# Patient Record
Sex: MALE | Race: WHITE | NOT HISPANIC OR LATINO | Employment: FULL TIME | URBAN - METROPOLITAN AREA
[De-identification: names, ages, dates, MRNs, and addresses within clinical notes are randomized per-mention and may not be internally consistent; named-entity substitution may affect disease eponyms.]

---

## 2017-01-04 ENCOUNTER — APPOINTMENT (OUTPATIENT)
Dept: PHYSICAL THERAPY | Facility: CLINIC | Age: 52
End: 2017-01-04
Payer: COMMERCIAL

## 2017-01-04 PROCEDURE — 97140 MANUAL THERAPY 1/> REGIONS: CPT

## 2017-01-04 PROCEDURE — 97110 THERAPEUTIC EXERCISES: CPT

## 2017-01-06 ENCOUNTER — APPOINTMENT (OUTPATIENT)
Dept: PHYSICAL THERAPY | Facility: CLINIC | Age: 52
End: 2017-01-06
Payer: COMMERCIAL

## 2017-01-06 PROCEDURE — 97140 MANUAL THERAPY 1/> REGIONS: CPT

## 2017-01-06 PROCEDURE — 97110 THERAPEUTIC EXERCISES: CPT

## 2017-01-09 ENCOUNTER — APPOINTMENT (OUTPATIENT)
Dept: PHYSICAL THERAPY | Facility: CLINIC | Age: 52
End: 2017-01-09
Payer: COMMERCIAL

## 2017-01-09 PROCEDURE — 97140 MANUAL THERAPY 1/> REGIONS: CPT

## 2017-01-09 PROCEDURE — 97110 THERAPEUTIC EXERCISES: CPT

## 2017-01-11 ENCOUNTER — APPOINTMENT (OUTPATIENT)
Dept: PHYSICAL THERAPY | Facility: CLINIC | Age: 52
End: 2017-01-11
Payer: COMMERCIAL

## 2017-01-11 PROCEDURE — 97110 THERAPEUTIC EXERCISES: CPT

## 2017-01-11 PROCEDURE — 97140 MANUAL THERAPY 1/> REGIONS: CPT

## 2017-01-16 ENCOUNTER — APPOINTMENT (OUTPATIENT)
Dept: PHYSICAL THERAPY | Facility: CLINIC | Age: 52
End: 2017-01-16
Payer: COMMERCIAL

## 2017-01-16 PROCEDURE — 97140 MANUAL THERAPY 1/> REGIONS: CPT

## 2017-01-16 PROCEDURE — 97110 THERAPEUTIC EXERCISES: CPT

## 2017-01-18 ENCOUNTER — APPOINTMENT (OUTPATIENT)
Dept: PHYSICAL THERAPY | Facility: CLINIC | Age: 52
End: 2017-01-18
Payer: COMMERCIAL

## 2017-01-18 PROCEDURE — 97110 THERAPEUTIC EXERCISES: CPT

## 2017-01-18 PROCEDURE — 97140 MANUAL THERAPY 1/> REGIONS: CPT

## 2017-01-23 ENCOUNTER — APPOINTMENT (OUTPATIENT)
Dept: PHYSICAL THERAPY | Facility: CLINIC | Age: 52
End: 2017-01-23
Payer: COMMERCIAL

## 2017-01-23 PROCEDURE — 97140 MANUAL THERAPY 1/> REGIONS: CPT

## 2017-01-23 PROCEDURE — 97110 THERAPEUTIC EXERCISES: CPT

## 2017-01-25 ENCOUNTER — APPOINTMENT (OUTPATIENT)
Dept: PHYSICAL THERAPY | Facility: CLINIC | Age: 52
End: 2017-01-25
Payer: COMMERCIAL

## 2017-01-25 PROCEDURE — 97140 MANUAL THERAPY 1/> REGIONS: CPT

## 2017-01-25 PROCEDURE — 97110 THERAPEUTIC EXERCISES: CPT

## 2017-02-01 ENCOUNTER — APPOINTMENT (OUTPATIENT)
Dept: PHYSICAL THERAPY | Facility: CLINIC | Age: 52
End: 2017-02-01
Payer: COMMERCIAL

## 2017-02-01 PROCEDURE — 97140 MANUAL THERAPY 1/> REGIONS: CPT

## 2017-02-01 PROCEDURE — 97110 THERAPEUTIC EXERCISES: CPT

## 2017-02-02 ENCOUNTER — APPOINTMENT (OUTPATIENT)
Dept: PHYSICAL THERAPY | Facility: CLINIC | Age: 52
End: 2017-02-02
Payer: COMMERCIAL

## 2017-02-02 PROCEDURE — 97140 MANUAL THERAPY 1/> REGIONS: CPT

## 2017-02-02 PROCEDURE — 97110 THERAPEUTIC EXERCISES: CPT

## 2017-02-06 ENCOUNTER — APPOINTMENT (OUTPATIENT)
Dept: PHYSICAL THERAPY | Facility: CLINIC | Age: 52
End: 2017-02-06
Payer: COMMERCIAL

## 2017-02-06 PROCEDURE — 97140 MANUAL THERAPY 1/> REGIONS: CPT

## 2017-02-06 PROCEDURE — 97110 THERAPEUTIC EXERCISES: CPT

## 2017-02-08 ENCOUNTER — APPOINTMENT (OUTPATIENT)
Dept: PHYSICAL THERAPY | Facility: CLINIC | Age: 52
End: 2017-02-08
Payer: COMMERCIAL

## 2017-02-08 PROCEDURE — 97140 MANUAL THERAPY 1/> REGIONS: CPT

## 2017-02-08 PROCEDURE — 97110 THERAPEUTIC EXERCISES: CPT

## 2017-04-19 ENCOUNTER — ALLSCRIPTS OFFICE VISIT (OUTPATIENT)
Dept: OTHER | Facility: OTHER | Age: 52
End: 2017-04-19

## 2018-01-10 NOTE — MISCELLANEOUS
Message  S/w pt on telephone regarding surgery scheduled 11/7/16  Verified allergies with patient and went over pre-op instructions, including hibiclens bathing and NPO status  Pt currently denies taking any blood thinning medications and states that he has been holding his celebrex for about a week now  Answered any questions he may have had at this time        Signatures   Electronically signed by : Nani Griffin RN; Nov 4 2016 10:51AM EST                       (Author)

## 2018-01-11 NOTE — PROGRESS NOTES
Preliminary Nursing Report                Patient Information    Initial Encounter Entry Date:   10/24/2016 1:16 PM EST (Automated Transmission Automated Transmission)       Last Modified:   {Gregoria Yap}              Legal Name: [address] [place] [address] Road Number:        YOB: 1965        Age (years): 54        Gender: M        Body Mass Index (BMI): 1 kg/m2        Height: 73 in  Weight: 11 12 lbs (5 kgs)           Address:   [address] [place] [address] [place]              Phone: [phone]   (consent to leave messages)        Email:        Ethnicity: Decline to State        Holiness:        Marital Status:        Preferred Language: English        Race: Other Race                    Patient Insurance Information        Primary Insurance Information Carrier Name: {Primary  CarrierName}           Carrier Address:   {Primary  CarrierAddress}              Carrier Phone: {Primary  CarrierPhone}          Group Number: {Primary  GroupNumber}          Policy Number: {Primary  PolicyNumber}          Insured Name: {Primary  InsuredName}          Insured : {Primary  InsuredDOB}          Relationship to Insured: {Primary  RelationshiptoInsured}           Secondary Insurance Information Carrier Name: {Secondary  CarrierName}           Carrier Address:   {Secondary  CarrierAddress}              Carrier Phone: {Secondary  CarrierPhone}          Group Number: {Secondary  GroupNumber}          Policy Number: {Secondary  PolicyNumber}          Insured Name: {Secondary  InsuredName}          Insured : {Secondary  InsuredDOB}          Relationship to Insured: {Secondary  RelationshiptoInsured}                       Health Profile   Booking #:   [employer] #: [id number]               DOS: 2016    Surgery : LOW BACK DISK SURGERY    Add'l Procedures/Notes:     Surgery Risk: Intermediate          Precautions     BMI                   Allergies    No Known Drug Allergies Medications    None               Conditions    Benign essential hypertension       Chronic back pain       Lumbar disc herniation       Lumbar radiculitis       Sciatica of left side               Family History    None             Surgical History    None             Social History    Functioning activity level       Clinical Comments: participates in moderate activities both inside and outside of the home;        High school or GED              Never A Smoker       No alcohol use       No drug use       Occupation       Clinical Comments:  @[employer]; One child                               Patient Instructions       ? NPO Instructions   The day before surgery it is recommended to have a light dinner at your usual time and you are allowed a light snack early in the evening  Do not eat anything heavy or eat a big meal after 7pm  Do not eat or drink anything after midnight prior to your surgery  If you are supposed to take any of your medications, do so with a sip of water  Failure to follow these instructions can lead to an increased risk of lung complications and may result in a delay or cancellation of your procedure  If you have any questions, contact your institution for further instructions  No candy, no gum, no mints, no chewing tobacco   Triggered by: Medical Procedure Risk               Testing Considerations       ? Basic Metabolic Panel (BMP) t  If test was completed and normal within last six months, repeat test is not necessary  Triggered by: Benign essential hypertension         ? Complete Blood Count (CBC) t, client, client  If test was completed and normal within last six months, repeat test is not necessary  Triggered by: Age or Facility Rec         ? Electrocardiogram (ECG) t  Patient does not need new test if normal ECG is present within the last six months and no change in clinical condition  Triggered by: Benign essential hypertension         ?  Type and Screen client  Type and Screen - Blood: If there is anticipated or possible large blood loss with this procedure, then a Type and Screen for Blood should be ordered  Triggered by: Age or Facility Rec               Consultations       No recommendations for this classification  Miscellaneous Questions         Question: Are you able to walk up a flight of stairs, walk up a hill or do heavy housework WITHOUT having chest pain or shortness of breath? Answer: YES                   Allergies/Conditions/Medications Not Found        The following were not recognized by our system when generating the recommendations  Please consider if this would impact any preoperative protocols  ? Functioning activity level       ? High school or GED       ?        ? Never A Smoker       ? No alcohol use       ? No drug use       ? Occupation       ? One child                  Appointment Information         Date:    11/07/2016        Location:    [facility]        Address:           Directions:                      Footnotes revision 14      ?? Denotes a free-text entry  Legal Disclaimer: Any and all recommendations and services provided herein are designed to assist in the preoperative care of the patient  Nothing contained herein is designed to replace, eliminate or alleviate the responsibility of the attending physician to supervise and determine the patient?s preoperative care and course of treatment  Failure to provide complete, accurate information may negatively impact the system?s ability to recommend the proper preoperative protocol  THE ATTENDING PHYSICIAN IS RESPONSIBLE TO REVIEW THE SUGGESTED PREOPERATIVE PROTOCOLS/COURSE OF TREATMENT AND PRESCRIBE THE FINAL COURSE OF PREOPERATIVE TREATMENT IN CONSULTATION WITH THE PATIENT  THE [employer] SYSTEM AND ITS MATERIALS ARE PROVIDED ? AS IS? WITHOUT WARRANTY OF ANY KIND, EXPRESS OR IMPLIED, INCLUDING, BUT NOT LIMITED TO, WARRANTIES OF PERFORMANCE OR MERCHANTABILITY OR FITNESS FOR A PARTICULAR PURPOSE  PATIENT AND PHYSICIANS HEREBY AGREE THAT THEIR USE OF THE MATERIALS AND RESOURCES ACT AS A CONSENT TO RELEASE AND WAIVE ePREOP FROM ANY AND ALL CLAIMS OF WARRANTY, TORT OR CONTRACT LAW OF ANY KIND  Electronically signed by:Bobby GAN    Oct 24 2016  2:18PM EST

## 2018-01-13 VITALS
DIASTOLIC BLOOD PRESSURE: 84 MMHG | OXYGEN SATURATION: 97 % | HEART RATE: 91 BPM | HEIGHT: 73 IN | SYSTOLIC BLOOD PRESSURE: 130 MMHG | BODY MASS INDEX: 23.4 KG/M2 | TEMPERATURE: 97.5 F | WEIGHT: 176.56 LBS

## 2018-01-13 NOTE — RESULT NOTES
Message   Please call patient, inform him that his x-ray of his low back only showed mild arthritic changes  Thank you     Verified Results  * XR SPINE LUMBAR MINIMUM 4 VIEWS NON INJURY [id number] 06:38PM Shari Scott Order Number: [id number]     Test Name Result Flag Reference   XR SPINE LUMBAR MINIMUM 4 VIEWS (Report)     LUMBAR SPINE     INDICATION: Left-sided low back pain radiating down left leg for 2 months  No known history of trauma  COMPARISON: None     VIEWS: AP, lateral, bilateral oblique and coned down projections; 5 images     FINDINGS:     Alignment is unremarkable  There is no radiographic evidence of acute fracture or destructive osseous lesion  Mild disc space narrowing throughout the lumbar spine with associated facet hypertrophic changes  Anterior osteophytic spur formation throughout the lumbar spine  Bridging anterior osteophytes L2-L3  Visualized soft tissues appear unremarkable  IMPRESSION:   Mild degenerative changes         Workstation performed: [id number]     Signed by:   Woody Nobles DO   9/22/16

## 2018-01-16 NOTE — MISCELLANEOUS
Message  S/w pt on telephone POD1  Pt reports that he is doing very well post-operatively  No major c/o pain other than mild incisional pain, which he expected  Denies any incisional issues or fevers  Advised him on Thursday he can take the dressing down and cleanse the area with mild soap and water and pat dry, but not to apply any creams, lotions or ointments and not to submerge in any water  Verified date/time/location of POVs and advised him to call our office with any further questions or concerns or if any incisional issues arise  he verbalized understanding        Signatures   Electronically signed by : Roseann Libman, RN; Nov 8 2016 11:24AM EST                       (Author)

## 2018-01-17 NOTE — PROGRESS NOTES
Chief Complaint  Patient presents post L5-S1 minimally invasive microdiscectomy with [employer] system for decompression and CARY  History of Present Illness  HPI: Patient presents for 2 week POV for incision check  Patient reports that he is doing well overall and he denies any incisional issues or fevers  Patient still has occasional achy pain down his left thigh  Active Problems    1  Benign essential hypertension (401 1) (I10)   2  Chronic back pain (724 5,338 29) (M54 9,G89 29)   3  Lumbar disc herniation (722 10) (M51 26)   4  Lumbar radiculitis (724 4) (M54 16)   5  Muscle spasm (728 85) (M62 838)   6  Preoperative testing (V72 84) (Z01 818)   7  Sciatica of left side (724 3) (M54 32)    Current Meds   1  Celecoxib 200 MG Oral Capsule (CeleBREX); take 1 capsule daily; Therapy: [id number] to (Last Omelia Soho)  Requested for: 25Oct2016 Ordered   2  Gabapentin 300 MG Oral Capsule; TAKE 1 CAPSULE AT BEDTIME; Therapy: 23RFW3989 to ([phone])  Requested for: 25Oct2016; Last   Rx:25Oct2016 Ordered    Allergies    1  No Known Drug Allergies    Vitals  Signs    Temperature: 97 7 F  Respiration: 18  Systolic: 960  Diastolic: 82  Height: 6 ft 1 in  Weight: 180 lb 3 2 oz  BMI Calculated: 23 77  BSA Calculated: 2 06    Procedure  The wound was located on the (midline low back)  Wound Exam: well healed with no sign of infection, Incision well approximated  No erythema, edema or drainage noted  Procedure Note:   Complications: Incision MODE   there were no complications  Plan  Muscle spasm    · Methocarbamol 750 MG Oral Tablet; TAKE 1 TABLET Bedtime  Sciatica of left side    · Oxycodone-Acetaminophen 5-325 MG Oral Tablet (Percocet); TAKE 1 TABLET  Twice daily PRN PAIN    Discussion/Summary    Reviewed incision care with patient including daily observation for s/s infection including: increased erythema, edema, drainage, dehiscence of incision or fever >101   Advised to cleanse area with mild soap and water and pat dry, but not to apply any creams, lotions or ointments  Patient currently taking 750mg Robaxin QHS as well as Percocet 5/325 BID, which is helping him manage his pain and occasional muscle spasms at night  Refills were provided for these medications today per KM  Patient to call our office with any further questions or concerns or if any of the s/s infection that we discussed today would arise  Future Appointments    Date/Time Provider Specialty Site   12/20/2016 10:30 AM FABRIZIO Roldan   Neurosurgery [facility]     Signatures   Electronically signed by : Andreina Lake RN; Nov 21 2016 11:05AM EST                       (Author)    Electronically signed by : FABRIZIO Lara ; Nov 21 2016  5:41PM EST

## 2019-12-05 ENCOUNTER — HOSPITAL ENCOUNTER (EMERGENCY)
Facility: HOSPITAL | Age: 54
Discharge: HOME | End: 2019-12-05
Attending: EMERGENCY MEDICINE | Admitting: EMERGENCY MEDICINE
Payer: COMMERCIAL

## 2019-12-05 ENCOUNTER — APPOINTMENT (EMERGENCY)
Dept: CT IMAGING | Facility: HOSPITAL | Age: 54
End: 2019-12-05
Payer: COMMERCIAL

## 2019-12-05 VITALS
TEMPERATURE: 97.3 F | SYSTOLIC BLOOD PRESSURE: 144 MMHG | BODY MASS INDEX: 23.62 KG/M2 | HEART RATE: 93 BPM | WEIGHT: 179.01 LBS | OXYGEN SATURATION: 95 % | RESPIRATION RATE: 16 BRPM | DIASTOLIC BLOOD PRESSURE: 80 MMHG

## 2019-12-05 DIAGNOSIS — N20.1 URETEROLITHIASIS: Primary | ICD-10-CM

## 2019-12-05 DIAGNOSIS — K44.9 HIATAL HERNIA: ICD-10-CM

## 2019-12-05 LAB
ALBUMIN SERPL BCP-MCNC: 4.1 G/DL (ref 3.5–5)
ALP SERPL-CCNC: 115 U/L (ref 46–116)
ALT SERPL W P-5'-P-CCNC: 35 U/L (ref 12–78)
ANION GAP SERPL CALCULATED.3IONS-SCNC: 12 MMOL/L (ref 4–13)
AST SERPL W P-5'-P-CCNC: 21 U/L (ref 5–45)
BACTERIA UR QL AUTO: ABNORMAL /HPF
BASOPHILS # BLD AUTO: 0.11 THOUSANDS/ΜL (ref 0–0.1)
BASOPHILS NFR BLD AUTO: 1 % (ref 0–1)
BILIRUB SERPL-MCNC: 0.47 MG/DL (ref 0.2–1)
BILIRUB UR QL STRIP: NEGATIVE
BUN SERPL-MCNC: 17 MG/DL (ref 5–25)
CALCIUM SERPL-MCNC: 9.4 MG/DL (ref 8.3–10.1)
CHLORIDE SERPL-SCNC: 102 MMOL/L (ref 100–108)
CLARITY UR: CLEAR
CO2 SERPL-SCNC: 27 MMOL/L (ref 21–32)
COLOR UR: YELLOW
CREAT SERPL-MCNC: 1.13 MG/DL (ref 0.6–1.3)
EOSINOPHIL # BLD AUTO: 0.14 THOUSAND/ΜL (ref 0–0.61)
EOSINOPHIL NFR BLD AUTO: 1 % (ref 0–6)
ERYTHROCYTE [DISTWIDTH] IN BLOOD BY AUTOMATED COUNT: 12.8 % (ref 11.6–15.1)
GFR SERPL CREATININE-BSD FRML MDRD: 74 ML/MIN/1.73SQ M
GLUCOSE SERPL-MCNC: 125 MG/DL (ref 65–140)
GLUCOSE UR STRIP-MCNC: NEGATIVE MG/DL
HCT VFR BLD AUTO: 49.5 % (ref 36.5–49.3)
HGB BLD-MCNC: 16.5 G/DL (ref 12–17)
HGB UR QL STRIP.AUTO: ABNORMAL
IMM GRANULOCYTES # BLD AUTO: 0.07 THOUSAND/UL (ref 0–0.2)
IMM GRANULOCYTES NFR BLD AUTO: 1 % (ref 0–2)
KETONES UR STRIP-MCNC: NEGATIVE MG/DL
LEUKOCYTE ESTERASE UR QL STRIP: NEGATIVE
LIPASE SERPL-CCNC: 341 U/L (ref 73–393)
LYMPHOCYTES # BLD AUTO: 1.87 THOUSANDS/ΜL (ref 0.6–4.47)
LYMPHOCYTES NFR BLD AUTO: 16 % (ref 14–44)
MCH RBC QN AUTO: 28.8 PG (ref 26.8–34.3)
MCHC RBC AUTO-ENTMCNC: 33.3 G/DL (ref 31.4–37.4)
MCV RBC AUTO: 86 FL (ref 82–98)
MONOCYTES # BLD AUTO: 0.78 THOUSAND/ΜL (ref 0.17–1.22)
MONOCYTES NFR BLD AUTO: 7 % (ref 4–12)
MUCOUS THREADS UR QL AUTO: ABNORMAL
NEUTROPHILS # BLD AUTO: 8.72 THOUSANDS/ΜL (ref 1.85–7.62)
NEUTS SEG NFR BLD AUTO: 74 % (ref 43–75)
NITRITE UR QL STRIP: NEGATIVE
NON-SQ EPI CELLS URNS QL MICRO: ABNORMAL /HPF
NRBC BLD AUTO-RTO: 0 /100 WBCS
PH UR STRIP.AUTO: 8 [PH]
PLATELET # BLD AUTO: 325 THOUSANDS/UL (ref 149–390)
PMV BLD AUTO: 11.3 FL (ref 8.9–12.7)
POTASSIUM SERPL-SCNC: 4 MMOL/L (ref 3.5–5.3)
PROT SERPL-MCNC: 7.9 G/DL (ref 6.4–8.2)
PROT UR STRIP-MCNC: NEGATIVE MG/DL
RBC # BLD AUTO: 5.73 MILLION/UL (ref 3.88–5.62)
RBC #/AREA URNS AUTO: ABNORMAL /HPF
SODIUM SERPL-SCNC: 141 MMOL/L (ref 136–145)
SP GR UR STRIP.AUTO: 1.01 (ref 1–1.03)
UROBILINOGEN UR QL STRIP.AUTO: 0.2 E.U./DL
WBC # BLD AUTO: 11.69 THOUSAND/UL (ref 4.31–10.16)
WBC #/AREA URNS AUTO: ABNORMAL /HPF

## 2019-12-05 PROCEDURE — 81001 URINALYSIS AUTO W/SCOPE: CPT | Performed by: PHYSICIAN ASSISTANT

## 2019-12-05 PROCEDURE — 74176 CT ABD & PELVIS W/O CONTRAST: CPT

## 2019-12-05 PROCEDURE — 80053 COMPREHEN METABOLIC PANEL: CPT | Performed by: PHYSICIAN ASSISTANT

## 2019-12-05 PROCEDURE — 85025 COMPLETE CBC W/AUTO DIFF WBC: CPT | Performed by: PHYSICIAN ASSISTANT

## 2019-12-05 PROCEDURE — 36415 COLL VENOUS BLD VENIPUNCTURE: CPT | Performed by: PHYSICIAN ASSISTANT

## 2019-12-05 PROCEDURE — 83690 ASSAY OF LIPASE: CPT | Performed by: PHYSICIAN ASSISTANT

## 2019-12-05 PROCEDURE — 99284 EMERGENCY DEPT VISIT MOD MDM: CPT | Performed by: PHYSICIAN ASSISTANT

## 2019-12-05 PROCEDURE — 99284 EMERGENCY DEPT VISIT MOD MDM: CPT

## 2019-12-05 PROCEDURE — 96361 HYDRATE IV INFUSION ADD-ON: CPT

## 2019-12-05 PROCEDURE — 96374 THER/PROPH/DIAG INJ IV PUSH: CPT

## 2019-12-05 RX ORDER — OXYCODONE HYDROCHLORIDE AND ACETAMINOPHEN 5; 325 MG/1; MG/1
1 TABLET ORAL EVERY 6 HOURS PRN
Qty: 4 TABLET | Refills: 0 | Status: SHIPPED | OUTPATIENT
Start: 2019-12-05 | End: 2019-12-15

## 2019-12-05 RX ORDER — IBUPROFEN 800 MG/1
800 TABLET ORAL EVERY 8 HOURS PRN
Qty: 21 TABLET | Refills: 0 | Status: SHIPPED | OUTPATIENT
Start: 2019-12-05 | End: 2021-10-27

## 2019-12-05 RX ORDER — KETOROLAC TROMETHAMINE 30 MG/ML
15 INJECTION, SOLUTION INTRAMUSCULAR; INTRAVENOUS ONCE
Status: COMPLETED | OUTPATIENT
Start: 2019-12-05 | End: 2019-12-05

## 2019-12-05 RX ORDER — TAMSULOSIN HYDROCHLORIDE 0.4 MG/1
0.4 CAPSULE ORAL
Qty: 7 CAPSULE | Refills: 0 | Status: SHIPPED | OUTPATIENT
Start: 2019-12-05 | End: 2020-10-09

## 2019-12-05 RX ADMIN — KETOROLAC TROMETHAMINE 15 MG: 30 INJECTION, SOLUTION INTRAMUSCULAR at 10:59

## 2019-12-05 RX ADMIN — SODIUM CHLORIDE 1000 ML: 0.9 INJECTION, SOLUTION INTRAVENOUS at 11:00

## 2019-12-05 NOTE — ED PROVIDER NOTES
History  Chief Complaint   Patient presents with    Flank Pain     Patient reports right flank and groin pain since this am      Patient is a 16-year-old male with a past medical history of hypertension and chronic back pain who presents with right flank pain and groin pain for 3 hours  Patient states he noted sudden onset aching to sharp right flank pain that radiates into his right groin  Denies any injury to the area, dysuria, hematuria, urinary frequency or urgency, testicular swelling, penile pain or discharge, history of kidney stones, previous similar symptoms  He notes associated nausea, but denies any vomiting, abdominal pain, diarrhea  He has not tried anything to help alleviate his symptoms  He states this feels different than his typical back pain  Prior to this incident patient was otherwise in his usual state of health, eating and drinking well, he denies any fevers, chills, diaphoresis, headaches, vision changes, neck pain or stiffness, congestion, cough, shortness of breath, chest pain, palpitations, or rash  Prior to Admission Medications   Prescriptions Last Dose Informant Patient Reported? Taking?    Celecoxib (CELEBREX PO) Not Taking at Unknown time  Yes No   Sig: Take 1 capsule by mouth daily   acetaminophen (TYLENOL) 325 mg tablet   Yes Yes   Sig: Take 650 mg by mouth every 6 (six) hours as needed for mild pain   cyclobenzaprine (FLEXERIL) 10 mg tablet Not Taking at Unknown time  Yes No   Sig: Take 10 mg by mouth 3 (three) times a day as needed for muscle spasms   methocarbamol (ROBAXIN) 750 mg tablet Not Taking at Unknown time  Yes No   Sig: Take 750 mg by mouth 4 (four) times a day as needed for muscle spasms      Facility-Administered Medications: None       Past Medical History:   Diagnosis Date    Chronic back pain     Essential hypertension     Lumbar herniated disc     Sciatica     Seasonal allergies        Past Surgical History:   Procedure Laterality Date    HAND SURGERY      ID LAMNOTMY INCL W/DCMPRSN NRV ROOT 1 INTRSPC LUMBR Left 11/7/2016    Procedure: L5-S1 MINIMALLY INVASIVE MICROSCOPIC DISCECTOMY AND POSSIBLE EPIDURAL STEROID INJECTION ;  Surgeon: Carmen Padilla MD;  [facility];  Service: Neurosurgery    WRIST SURGERY         History reviewed  No pertinent family history  I have reviewed and agree with the history as documented  Social History     Tobacco Use    Smoking status: Never Smoker    Smokeless tobacco: Never Used   Substance Use Topics    Alcohol use: No    Drug use: No        Review of Systems   Constitutional: Negative for chills, diaphoresis and fever  HENT: Negative for congestion, rhinorrhea and sore throat  Eyes: Negative for visual disturbance  Respiratory: Negative for cough, shortness of breath, wheezing and stridor  Cardiovascular: Negative for chest pain, palpitations and leg swelling  Gastrointestinal: Negative for abdominal pain, diarrhea, nausea and vomiting  Genitourinary: Positive for flank pain and testicular pain  Negative for decreased urine volume, difficulty urinating, discharge, dysuria, frequency, genital sores, hematuria, penile pain, penile swelling, scrotal swelling and urgency  Musculoskeletal: Negative for myalgias, neck pain and neck stiffness  Skin: Negative for color change, pallor and rash  Neurological: Negative for dizziness, weakness, light-headedness, numbness and headaches  All other systems reviewed and are negative  Physical Exam  Physical Exam   Constitutional: He is oriented to person, place, and time  He appears well-developed and well-nourished  He is active and cooperative  Non-toxic appearance  He does not have a sickly appearance  He does not appear ill  Patient appears uncomfortable holding right flank  Non-toxic appearing   HENT:   Head: Normocephalic and atraumatic     Right Ear: External ear normal    Left Ear: External ear normal    Nose: Nose normal  Mouth/Throat: Uvula is midline, oropharynx is clear and moist and mucous membranes are normal    Eyes: Pupils are equal, round, and reactive to light  Conjunctivae and EOM are normal    Neck: Normal range of motion  Neck supple  Cardiovascular: Normal rate, regular rhythm, S1 normal, S2 normal, normal heart sounds, intact distal pulses and normal pulses  Pulmonary/Chest: Effort normal and breath sounds normal  No stridor  No respiratory distress  He has no wheezes  Abdominal: Soft  Normal appearance and bowel sounds are normal  He exhibits no distension  There is no tenderness  There is CVA tenderness (right-sided)  There is no rigidity, no rebound and no guarding  Hernia confirmed negative in the right inguinal area and confirmed negative in the left inguinal area  Genitourinary: Testes normal and penis normal  Cremasteric reflex is present  Right testis shows no mass, no swelling and no tenderness  Left testis shows no mass, no swelling and no tenderness  Circumcised  Genitourinary Comments: Testicles with normal position, non-tender to palpation  Exam performed in the presence of ED tech Artelineno Carr  Musculoskeletal: Normal range of motion  Neurological: He is alert and oriented to person, place, and time  He has normal strength  GCS eye subscore is 4  GCS verbal subscore is 5  GCS motor subscore is 6  Skin: Skin is warm and dry  Capillary refill takes less than 2 seconds  He is not diaphoretic  Nursing note and vitals reviewed        Vital Signs  ED Triage Vitals   Temperature Pulse Respirations Blood Pressure SpO2   12/05/19 1032 12/05/19 1032 12/05/19 1032 12/05/19 1032 12/05/19 1032   (!) 97 3 °F (36 3 °C) 83 16 152/83 98 %      Temp src Heart Rate Source Patient Position - Orthostatic VS BP Location FiO2 (%)   -- 12/05/19 1032 12/05/19 1142 12/05/19 1142 --    Monitor Lying Left arm       Pain Score       12/05/19 1032       Worst Possible Pain           Vitals:    12/05/19 1032 12/05/19 1142 12/05/19 1323   BP: 152/83 135/84 144/80   Pulse: 83 95 93   Patient Position - Orthostatic VS:  Lying Sitting         Visual Acuity      ED Medications  Medications   sodium chloride 0 9 % bolus 1,000 mL (0 mL Intravenous Stopped 12/5/19 1436)   ketorolac (TORADOL) injection 15 mg (15 mg Intravenous Given 12/5/19 1059)       Diagnostic Studies  Results Reviewed     Procedure Component Value Units Date/Time    Urine Microscopic [[id number]]  (Abnormal) Collected:  12/05/19 1322    Lab Status:  Final result Specimen:  Urine, Clean Catch Updated:  12/05/19 1338     RBC, UA 30-50 /hpf      WBC, UA 0-1 /hpf      Epithelial Cells Occasional /hpf      Bacteria, UA Occasional /hpf      MUCUS THREADS Moderate    UA w Reflex to Microscopic w Reflex to Culture [[id number]]  (Abnormal) Collected:  12/05/19 1322    Lab Status:  Final result Specimen:  Urine, Clean Catch Updated:  12/05/19 1331     Color, UA Yellow     Clarity, UA Clear     Specific Gravity, UA 1 015     pH, UA 8 0     Leukocytes, UA Negative     Nitrite, UA Negative     Protein, UA Negative mg/dl      Glucose, UA Negative mg/dl      Ketones, UA Negative mg/dl      Urobilinogen, UA 0 2 E U /dl      Bilirubin, UA Negative     Blood, UA Large    Comprehensive metabolic panel [[id number]] Collected:  12/05/19 1100    Lab Status:  Final result Specimen:  Blood from Arm, Left Updated:  12/05/19 1120     Sodium 141 mmol/L      Potassium 4 0 mmol/L      Chloride 102 mmol/L      CO2 27 mmol/L      ANION GAP 12 mmol/L      BUN 17 mg/dL      Creatinine 1 13 mg/dL      Glucose 125 mg/dL      Calcium 9 4 mg/dL      AST 21 U/L      ALT 35 U/L      Alkaline Phosphatase 115 U/L      Total Protein 7 9 g/dL      Albumin 4 1 g/dL      Total Bilirubin 0 47 mg/dL      eGFR 74 ml/min/1 73sq m     Narrative:       Nia guidelines for Chronic Kidney Disease (CKD):     Stage 1 with normal or high GFR (GFR > 90 mL/min/1 73 square meters)    Stage 2 Mild CKD (GFR = 60-89 mL/min/1 73 square meters)    Stage 3A Moderate CKD (GFR = 45-59 mL/min/1 73 square meters)    Stage 3B Moderate CKD (GFR = 30-44 mL/min/1 73 square meters)    Stage 4 Severe CKD (GFR = 15-29 mL/min/1 73 square meters)    Stage 5 End Stage CKD (GFR <15 mL/min/1 73 square meters)  Note: GFR calculation is accurate only with a steady state creatinine    Lipase [[id number]]  (Normal) Collected:  12/05/19 1100    Lab Status:  Final result Specimen:  Blood from Arm, Left Updated:  12/05/19 1120     Lipase 341 u/L     CBC and differential [[id number]]  (Abnormal) Collected:  12/05/19 1100    Lab Status:  Final result Specimen:  Blood from Arm, Left Updated:  12/05/19 1106     WBC 11 69 Thousand/uL      RBC 5 73 Million/uL      Hemoglobin 16 5 g/dL      Hematocrit 49 5 %      MCV 86 fL      MCH 28 8 pg      MCHC 33 3 g/dL      RDW 12 8 %      MPV 11 3 fL      Platelets 667 Thousands/uL      nRBC 0 /100 WBCs      Neutrophils Relative 74 %      Immat GRANS % 1 %      Lymphocytes Relative 16 %      Monocytes Relative 7 %      Eosinophils Relative 1 %      Basophils Relative 1 %      Neutrophils Absolute 8 72 Thousands/µL      Immature Grans Absolute 0 07 Thousand/uL      Lymphocytes Absolute 1 87 Thousands/µL      Monocytes Absolute 0 78 Thousand/µL      Eosinophils Absolute 0 14 Thousand/µL      Basophils Absolute 0 11 Thousands/µL                  CT renal stone study abdomen pelvis without contrast   Final Result by Jamaal Bhatia DO (12/05 1207)   1  Mildly obstructing 4 mm proximal right ureteric calculus, just below the ureteropelvic junction  2   Additional nonobstructing bilateral renal calculi, right side larger than left  The largest calculus measures 8mm in the midpole of the right kidney  3   Hiatal hernia with thickening of the distal esophagus  Correlate for reflux esophagitis  The study was marked in [facility] for immediate notification        Workstation performed: [id number] Procedures  Procedures         ED Course  ED Course as of Dec 06 1521   Thu Dec 05, 2019   1303 IMPRESSION:  1  Mildly obstructing 4 mm proximal right ureteric calculus, just below the ureteropelvic junction  2   Additional nonobstructing bilateral renal calculi, right side larger than left  The largest calculus measures 8mm in the midpole of the right kidney  3   Hiatal hernia with thickening of the distal esophagus  Correlate for reflux esophagitis  CT renal stone study abdomen pelvis without contrast   1315 Patient notes resolution of symptoms and states he feels much better  Reviewed all results with patient including all CT findings including hiatal hernia and distal esophagus findings  Awaiting patient's urine to verify no infection      1345 Nitrite, UA: Negative   1345 Leukocytes, UA: Negative                               MDM  Number of Diagnoses or Management Options  Hiatal hernia:   Ureterolithiasis:   Diagnosis management comments: Reviewed all results with patient and answered questions  Reviewed treatment at home and recommended follow-up with Urology for further evaluation  Reviewed red flag symptoms and strict return to ED instructions  Patient notes understanding and agrees to plan  As patient was leaving, he noted another wave of pain  Provided with additional limited dose of Percocet for severe pain, reviewed medication education  Disposition  Final diagnoses:   Ureterolithiasis   Hiatal hernia     Time reflects when diagnosis was documented in both MDM as applicable and the Disposition within this note     Time User Action Codes Description Comment    12/5/2019  2:05 PM Arcelia Dopp Add [N20 1] Ureterolithiasis     12/5/2019  2:07 PM Arcelia Dopp Add [K44 9] Hiatal hernia       ED Disposition     ED Disposition Condition Date/Time Comment    Discharge Stable Thu Dec 5, 2019  2:09 PM Alejandro Mireles discharge to home/self care              Follow-up Information     Follow up With Specialties Details Why Contact Info Additional Information    Preston [place] Trevor, DO Family Medicine  As needed [address]  [address] [facility]  [phone]       [facility] Emergency Department Emergency Medicine  If symptoms worsen [place] [address] [phone] [place] ED, [address]  , [place], [place], [address] Urology [facility] Schedule an appointment as soon as possible for a visit   [place] [phone]  [address]  [facility] [place], [address], [place], [place], [address]   [employer] [facility] In 1 week  [address]  [place] [facility] [address] [address]  Kashif Smith [address] [facility], [address], [address], [place], [place], [address] [phone]          Discharge Medication List as of 12/5/2019  2:41 PM      START taking these medications    Details   ibuprofen (MOTRIN) 800 mg tablet Take 1 tablet (800 mg total) by mouth every 8 (eight) hours as needed for mild pain or moderate pain, Starting Thu 12/5/2019, Print      oxyCODONE-acetaminophen (PERCOCET) 5-325 mg per tablet Take 1 tablet by mouth every 6 (six) hours as needed for moderate pain or severe pain for up to 10 daysMax Daily Amount: 4 tablets, Starting Thu 12/5/2019, Until Sun 12/15/2019, Normal      tamsulosin (FLOMAX) 0 4 mg Take 1 capsule (0 4 mg total) by mouth daily with dinner for 7 days, Starting Thu 12/5/2019, Until u 12/12/2019, Print         CONTINUE these medications which have NOT CHANGED    Details   acetaminophen (TYLENOL) 325 mg tablet Take 650 mg by mouth every 6 (six) hours as needed for mild pain, Until Discontinued, Historical Med      Celecoxib (CELEBREX PO) Take 1 capsule by mouth daily, Until Discontinued, Historical Med      cyclobenzaprine (FLEXERIL) 10 mg tablet Take 10 mg by mouth 3 (three) times a day as needed for muscle spasms, Until Discontinued, Historical Med      methocarbamol (ROBAXIN) 750 mg tablet Take 750 mg by mouth 4 (four) times a day as needed for muscle spasms, Until Discontinued, Historical Med           No discharge procedures on file      ED Provider  Electronically Signed by           Saintclair Hal, PA-C  12/06/19 9836

## 2019-12-05 NOTE — DISCHARGE INSTRUCTIONS
Start taking Flomax daily  Take Percocet as prescribed as needed for severe pain  Stay hydrated, drink lots of fluids  Continue Tylenol and ibuprofen as prescribed as needed for pain  Strain your urine and follow-up with Urology for further evaluation and monitoring of symptoms  Return to ED if symptoms worsen

## 2019-12-05 NOTE — ED NOTES
Pt began complaining of pain after receiving discharge paperwork from provider  Patient afraid pain is worse with movement and that it will continue increasing when he returns home  Alejandro Bejarano made aware and will be down to talk with patient        Jamal Sutton RN  12/05/19 2484

## 2020-08-23 ENCOUNTER — APPOINTMENT (EMERGENCY)
Dept: RADIOLOGY | Facility: HOSPITAL | Age: 55
End: 2020-08-23
Payer: COMMERCIAL

## 2020-08-23 ENCOUNTER — HOSPITAL ENCOUNTER (EMERGENCY)
Facility: HOSPITAL | Age: 55
Discharge: HOME | End: 2020-08-23
Attending: EMERGENCY MEDICINE
Payer: COMMERCIAL

## 2020-08-23 VITALS
DIASTOLIC BLOOD PRESSURE: 104 MMHG | BODY MASS INDEX: 23.68 KG/M2 | HEART RATE: 97 BPM | TEMPERATURE: 98.7 F | RESPIRATION RATE: 18 BRPM | WEIGHT: 179.45 LBS | SYSTOLIC BLOOD PRESSURE: 166 MMHG | OXYGEN SATURATION: 96 %

## 2020-08-23 DIAGNOSIS — S63.502A SPRAIN OF LEFT WRIST, INITIAL ENCOUNTER: Primary | ICD-10-CM

## 2020-08-23 PROCEDURE — 73130 X-RAY EXAM OF HAND: CPT

## 2020-08-23 PROCEDURE — 73110 X-RAY EXAM OF WRIST: CPT

## 2020-08-23 PROCEDURE — 99283 EMERGENCY DEPT VISIT LOW MDM: CPT

## 2020-08-23 PROCEDURE — 99284 EMERGENCY DEPT VISIT MOD MDM: CPT | Performed by: EMERGENCY MEDICINE

## 2020-08-23 NOTE — Clinical Note
Chapo Puentes was seen and treated in our emergency department on 8/23/2020  Diagnosis:     Roberto    He may return on this date:     May not return to work until cleared by orthopedics OR by occupational medicine  If you have any questions or concerns, please don't hesitate to call        Tamy Arias PA-C    ______________________________           _______________          _______________  Hospital Representative                              Date                                Time

## 2020-08-23 NOTE — ED PROVIDER NOTES
History  Chief Complaint   Patient presents with    Wrist Injury     Left wrist and hand pain since yesterday after falling down bleachers  Lissette Partida is a 51 yo M presenting with left wrist pain after falling onto outstretched hand yesterday evening off of a bleacher  States he had mild pain over wrist initially, but reports is has worsened today along with a small amount of swelling  Denies numbness, tingling, or weakness in extremity  Patient reports he is right handed  Denies head strike/LOC  History provided by:  Patient   used: No    Wrist Pain   Location:  L wrist  Onset quality:  Sudden  Duration:  1 day  Timing:  Constant  Context:  [address]  Relieved by:  Rest  Worsened by:  Use of wrist  Associated symptoms: no abdominal pain, no chest pain, no congestion, no cough, no fever, no nausea, no rash, no rhinorrhea, no shortness of breath, no sore throat, no vomiting and no wheezing        Prior to Admission Medications   Prescriptions Last Dose Informant Patient Reported? Taking?    Celecoxib (CELEBREX PO)   Yes No   Sig: Take 1 capsule by mouth daily   acetaminophen (TYLENOL) 325 mg tablet   Yes No   Sig: Take 650 mg by mouth every 6 (six) hours as needed for mild pain   cyclobenzaprine (FLEXERIL) 10 mg tablet   Yes No   Sig: Take 10 mg by mouth 3 (three) times a day as needed for muscle spasms   ibuprofen (MOTRIN) 800 mg tablet   No No   Sig: Take 1 tablet (800 mg total) by mouth every 8 (eight) hours as needed for mild pain or moderate pain   methocarbamol (ROBAXIN) 750 mg tablet   Yes No   Sig: Take 750 mg by mouth 4 (four) times a day as needed for muscle spasms   tamsulosin (FLOMAX) 0 4 mg   No No   Sig: Take 1 capsule (0 4 mg total) by mouth daily with dinner for 7 days      Facility-Administered Medications: None       Past Medical History:   Diagnosis Date    Chronic back pain     Essential hypertension     Lumbar herniated disc     Sciatica     Seasonal allergies Past Surgical History:   Procedure Laterality Date    BACK SURGERY      HAND SURGERY      DC LAMNOTMY INCL W/DCMPRSN NRV ROOT 1 INTRSPC LUMBR Left 11/7/2016    Procedure: L5-S1 MINIMALLY INVASIVE MICROSCOPIC DISCECTOMY AND POSSIBLE EPIDURAL STEROID INJECTION ;  Surgeon: Alejo Najera MD;  [facility];  Service: Neurosurgery    WRIST SURGERY         History reviewed  No pertinent family history  I have reviewed and agree with the history as documented  E-Cigarette/Vaping    E-Cigarette Use Never User      E-Cigarette/Vaping Substances     Social History     Tobacco Use    Smoking status: Never Smoker    Smokeless tobacco: Never Used   Substance Use Topics    Alcohol use: No    Drug use: No       Review of Systems   Constitutional: Negative for chills and fever  HENT: Negative for congestion, rhinorrhea and sore throat  Eyes: Negative for pain and visual disturbance  Respiratory: Negative for cough, shortness of breath and wheezing  Cardiovascular: Negative for chest pain and palpitations  Gastrointestinal: Negative for abdominal pain, nausea and vomiting  Genitourinary: Negative for dysuria, frequency and urgency  Musculoskeletal: Positive for arthralgias and joint swelling  Negative for back pain, neck pain and neck stiffness  Skin: Negative for rash and wound  Neurological: Negative for dizziness, weakness, light-headedness and numbness  Physical Exam  Physical Exam  Constitutional:       General: He is not in acute distress  Appearance: He is well-developed  He is not diaphoretic  HENT:      Head: Normocephalic and atraumatic  Right Ear: External ear normal       Left Ear: External ear normal    Eyes:      Conjunctiva/sclera: Conjunctivae normal       Pupils: Pupils are equal, round, and reactive to light  Neck:      Musculoskeletal: Normal range of motion and neck supple  Cardiovascular:      Rate and Rhythm: Normal rate and regular rhythm  Heart sounds: Normal heart sounds  No murmur  No friction rub  No gallop  Comments: Brisk capillary refill, 2+ radial pulse to LUE  Pulmonary:      Effort: Pulmonary effort is normal  No respiratory distress  Breath sounds: Normal breath sounds  No wheezing  Abdominal:      General: There is no distension  Palpations: Abdomen is soft  Tenderness: There is no abdominal tenderness  Musculoskeletal:      Comments: Diffuse TTP, mild edema diffusely to L wrist primarily over ulnar side  Slightly decreased ROM in flexion/extension due to pain  No deformity  Lymphadenopathy:      Cervical: No cervical adenopathy  Skin:     General: Skin is warm and dry  Capillary Refill: Capillary refill takes less than 2 seconds  Findings: No erythema or rash  Neurological:      Mental Status: He is alert and oriented to person, place, and time  Motor: No abnormal muscle tone  Coordination: Coordination normal       Comments: Intact sensation to L hand/fingers   Psychiatric:         Behavior: Behavior normal          Thought Content:  Thought content normal          Judgment: Judgment normal          Vital Signs  ED Triage Vitals [08/23/20 1045]   Temperature Pulse Respirations Blood Pressure SpO2   98 7 °F (37 1 °C) 97 18 (!) 166/104 96 %      Temp Source Heart Rate Source Patient Position - Orthostatic VS BP Location FiO2 (%)   Temporal -- -- -- --      Pain Score       8           Vitals:    08/23/20 1045   BP: (!) 166/104   Pulse: 97         Visual Acuity      ED Medications  Medications - No data to display    Diagnostic Studies  Results Reviewed     None                 XR wrist 3+ views LEFT   ED Interpretation by Chrissy Gatica PA-C (08/23 1132)      XR hand 3+ views LEFT    (Results Pending)              Procedures  Procedures         ED Course                                             MDM  Number of Diagnoses or Management Options  Sprain of left wrist, initial encounter:   Diagnosis management comments: Diffuse L wrist pain after fall onto outstretched hand yesterday evening  Tenderness over ulnar portion of wrist, mild edema  Intact pulses and sensation to left hand  No acute fracture seen on my initial xray read  ACE wrap applied and well tolerated  Follow up with orthopedics recommended  Strict return to ED instructions discussed  Amount and/or Complexity of Data Reviewed  Tests in the radiology section of CPT®: ordered    Patient Progress  Patient progress: stable        Disposition  Final diagnoses:   Sprain of left wrist, initial encounter     Time reflects when diagnosis was documented in both MDM as applicable and the Disposition within this note     Time User Action Codes Description Comment    8/23/2020 11:46 AM Cinda Ramirez [S63 502A] Sprain of left wrist, initial encounter       ED Disposition     ED Disposition Condition Date/Time Comment    Discharge Stable Sun Aug 23, 2020 11:46 AM Ashely Leija discharge to home/self care              Follow-up Information     Follow up With Specialties Details Why Contact Info Additional [address] [facility] Orthopedic Surgery Schedule an appointment as soon as possible for a visit   [address] [facility]  [address] [facility] [address]  [address] [place] [address], [address] [facility], [address] [place], [place], [address]   [address] [place] [address]   For return to work clearance as needed [address] [place] [address]  [phone]           Discharge Medication List as of 8/23/2020 12:21 PM      CONTINUE these medications which have NOT CHANGED    Details   acetaminophen (TYLENOL) 325 mg tablet Take 650 mg by mouth every 6 (six) hours as needed for mild pain, Until Discontinued, Historical Med      Celecoxib (CELEBREX PO) Take 1 capsule by mouth daily, Until Discontinued, Historical Med      cyclobenzaprine (FLEXERIL) 10 mg tablet Take 10 mg by mouth 3 (three) times a day as needed for muscle spasms, Until Discontinued, Historical Med      ibuprofen (MOTRIN) 800 mg tablet Take 1 tablet (800 mg total) by mouth every 8 (eight) hours as needed for mild pain or moderate pain, Starting Thu 12/5/2019, Print      methocarbamol (ROBAXIN) 750 mg tablet Take 750 mg by mouth 4 (four) times a day as needed for muscle spasms, Until Discontinued, Historical Med      tamsulosin (FLOMAX) 0 4 mg Take 1 capsule (0 4 mg total) by mouth daily with dinner for 7 days, Starting Thu 12/5/2019, Until Thu 12/12/2019, Print           No discharge procedures on file      PDMP Review     None          ED Provider  Electronically Signed by           Lobo Restrepo PA-C  08/23/20 3430

## 2020-08-23 NOTE — DISCHARGE INSTRUCTIONS
Please refer to the attached information for strict return instructions  If symptoms worsen or new symptoms develop please return to the ER  Please follow up with orthopedics as soon as possible  You may follow with occupational medicine for return to work clearance

## 2020-08-24 NOTE — RESULT ENCOUNTER NOTE
Patient called back, will return to the ED for thumb spica splint, has appointment with Orthopedics coming up

## 2020-08-24 NOTE — ED NOTES
Pt returned for call back for splint application, thumb spica applied per [facility]        Delano Ortega RN  08/24/20 3987

## 2020-08-24 NOTE — RESULT ENCOUNTER NOTE
Called patient's phone number on file - [facility] asking patient to call ER for results  Patient possible radius fracture, not splinted in emergency department, will need to return for splint

## 2020-08-25 ENCOUNTER — TELEPHONE (OUTPATIENT)
Dept: OBGYN CLINIC | Facility: HOSPITAL | Age: 55
End: 2020-08-25

## 2020-08-25 NOTE — TELEPHONE ENCOUNTER
MRN: [id number]  Patient Name: Marisol SOTO B: 1965  Dept & Loc: Orth[facility]  Appt Notes: NP/LT WRIST FX/SLED XRAYS/BCBS/COVID-19 SCREENED 08-24-20 [place]  Visit Type: new patient  Provider Name: Omid  Appointment Desired Day/Time: Patient is scheduled with Dr Constantin Beck at [facility] on 09-03-20 at 2:45PM but received a clal from the hospital notifying him that there IS a fracture and he should be seen sooner than scheduled  They did have him go back to the hospital for a splint  Best CB# [phone]    [facility]  78  org    Sent via email to [place]

## 2020-08-28 ENCOUNTER — OFFICE VISIT (OUTPATIENT)
Dept: OBGYN CLINIC | Facility: HOSPITAL | Age: 55
End: 2020-08-28
Payer: COMMERCIAL

## 2020-08-28 VITALS
DIASTOLIC BLOOD PRESSURE: 84 MMHG | BODY MASS INDEX: 23.19 KG/M2 | HEIGHT: 73 IN | WEIGHT: 175 LBS | HEART RATE: 96 BPM | SYSTOLIC BLOOD PRESSURE: 118 MMHG

## 2020-08-28 DIAGNOSIS — S52.572A OTHER CLOSED INTRA-ARTICULAR FRACTURE OF DISTAL END OF LEFT RADIUS, INITIAL ENCOUNTER: Primary | ICD-10-CM

## 2020-08-28 PROCEDURE — 99203 OFFICE O/P NEW LOW 30 MIN: CPT | Performed by: SURGERY

## 2020-08-28 PROCEDURE — 29075 APPL CST ELBW FNGR SHORT ARM: CPT | Performed by: SURGERY

## 2020-08-28 NOTE — PROGRESS NOTES
Dorinda GAN  Attending, Orthopaedic Surgery  Hand, Wrist, and Elbow Surgery  [facility]      ORTHOPAEDIC HAND, WRIST, AND ELBOW OFFICE  VISIT       ASSESSMENT/PLAN:      51 yo male with left distal radius fracture - dorsal cortical abnormality on xray, possible extension to radial styloid  Cast for 2 weeks, then re-evaluate with xray out of cast  If improving consider transition to wrist brace depending on xray  FMLA forms given today to keep him out of work for 2 weeks pending re-assessment of fracture  The patient verbalized understanding of exam findings and treatment plan  We engaged in the shared decision-making process and treatment options were discussed at length with the patient  Surgical and conservative management discussed today along with risks and benefits  Diagnoses and all orders for this visit:    Other closed intra-articular fracture of distal end of left radius, initial encounter        Follow Up:  Return in about 2 weeks (around 9/11/2020) for Recheck  To Do Next Visit:  Re-evaluation of current issue, Xray left wrist out of cast      General Discussions:  Fracture - Nonoperative Care: The physiology of a fractured bone was discussed with the patient today  With non-displaced or minimally displaced fractures, conservative treatment such as casting or splinting often results in a functional recovery  Typically, these fractures are immobilized in either a cast or splint depending on the pattern  Radiographs are typically taken at intervals throughout the fracture healing to ensure that reduction or alignment is not lost   If the fracture loses its alignment, surgical intervention may be required to stabilize it  Medical conditions such as diabetes, osteoporosis, vitamin D deficiency, and a history of or exposure to smoking may delay or prevent fracture healing   Options between cast/splint immobilization and surgical treatment were offered and the risks and benefits of both were discussed  ____________________________________________________________________________________________________________________________________________      CHIEF COMPLAINT:  Chief Complaint   Patient presents with    Left Wrist - Pain       SUBJECTIVE:  Hai Turner is a 50y o  year old RHD male who presents for evaluation of a left distal radius fracture that occurred on 08/22/2020  Patient [place] ble[place] when he tripped on a shoe lace and fell on his outstretched hand  He presented to the ER and was placed in just an Ace wrap then called back later with x-ray abnormality on the dorsal distal radius  He is placed in a thumb spica splint and advised to follow up with Orthopedics  Currently he notes some soreness in the wrist but denies any numbness or tingling  No previous injuries to this wrist       Pain/symptom timing:  Worse during the day when active  Pain/symptom context:  Worse with activites and work  Pain/symptom modifying factors:  Rest makes better, activities make worse  Pain/symptom associated signs/symptoms: none    Prior treatment   · NSAIDsNo   · Injections No   · Bracing/Orthotics No    Physical Therapy No     I have personally reviewed all the relevant PMH, PSH, SH, FH, Medications and allergies      PAST MEDICAL HISTORY:  Past Medical History:   Diagnosis Date    Chronic back pain     Essential hypertension     Lumbar herniated disc     Sciatica     Seasonal allergies        PAST SURGICAL HISTORY:  Past Surgical History:   Procedure Laterality Date    BACK SURGERY      HAND SURGERY      [place] LAMNOTMY INCL W/DCMPRSN NRV ROOT 1 INTRSPC LUMBR Left 11/7/2016    Procedure: L5-S1 MINIMALLY INVASIVE MICROSCOPIC DISCECTOMY AND POSSIBLE EPIDURAL STEROID INJECTION ;  Surgeon: Patricia Sosa MD;  [facility];  Service: Neurosurgery    WRIST SURGERY         FAMILY HISTORY:  History reviewed   No pertinent family history  SOCIAL HISTORY:  Social History     Tobacco Use    Smoking status: Never Smoker    Smokeless tobacco: Never Used   Substance Use Topics    Alcohol use: No    Drug use: No       MEDICATIONS:    Current Outpatient Medications:     acetaminophen (TYLENOL) 325 mg tablet, Take 650 mg by mouth every 6 (six) hours as needed for mild pain, Disp: , Rfl:     Celecoxib (CELEBREX PO), Take 1 capsule by mouth daily, Disp: , Rfl:     cyclobenzaprine (FLEXERIL) 10 mg tablet, Take 10 mg by mouth 3 (three) times a day as needed for muscle spasms, Disp: , Rfl:     ibuprofen (MOTRIN) 800 mg tablet, Take 1 tablet (800 mg total) by mouth every 8 (eight) hours as needed for mild pain or moderate pain (Patient not taking: Reported on 8/28/2020), Disp: 21 tablet, Rfl: 0    methocarbamol (ROBAXIN) 750 mg tablet, Take 750 mg by mouth 4 (four) times a day as needed for muscle spasms, Disp: , Rfl:     tamsulosin (FLOMAX) 0 4 mg, Take 1 capsule (0 4 mg total) by mouth daily with dinner for 7 days, Disp: 7 capsule, Rfl: 0    ALLERGIES:  No Known Allergies        REVIEW OF SYSTEMS:  Review of Systems   Constitutional: Negative for chills, fatigue, fever and unexpected weight change  HENT: Negative for congestion, dental problem, facial swelling, hearing loss, sneezing, sore throat, trouble swallowing and voice change  Respiratory: Negative for chest tightness, shortness of breath, wheezing and stridor  Cardiovascular: Negative for chest pain, palpitations and leg swelling  Gastrointestinal: Negative for abdominal pain, diarrhea, nausea and vomiting  Endocrine: Negative for cold intolerance and heat intolerance  Musculoskeletal: Positive for arthralgias  Negative for joint swelling, myalgias and neck pain  Skin: Negative for color change, pallor, rash and wound  Neurological: Negative for tremors, facial asymmetry, speech difficulty, weakness and numbness         VITALS:  Vitals:    08/28/20 1348   BP: 118/84 Pulse: 96       LABS:  HgA1c: No results found for: HGBA1C  BMP:   Lab Results   Component Value Date    CALCIUM 9 4 12/05/2019    K 4 0 12/05/2019    CO2 27 12/05/2019     12/05/2019    BUN 17 12/05/2019    CREATININE 1 13 12/05/2019       _____________________________________________________  PHYSICAL EXAMINATION:  General: well developed and well nourished, alert, oriented times 3 and appears comfortable  Psychiatric: Normal  HEENT: Normocephalic, Atraumatic Trachea Midline, No torticollis  Pulmonary: No audible wheezing or respiratory distress   Cardiovascular: No pitting edema, 2+ radial pulse   Skin: No masses, erythema, lacerations, fluctation, ulcerations  Neurovascular: Sensation Intact to the Median, Ulnar, Radial Nerve, Motor Intact to the Median, Ulnar, Radial Nerve and Pulses Intact  Musculoskeletal: Normal, except as noted in detailed exam and in HPI        MUSCULOSKELETAL EXAMINATION:  Left wrist:   No significant edema, no erythema or ecchymosis  Tender palpation over dorsal distal radius  Mild tenderness over the radial ulnar aspects of the wrist as well  Full composite fist possible sensation intact to light touch    ___________________________________________________  STUDIES REVIEWED:  I have personally reviewed AP lateral and oblique radiographs of left wrist which demonstrate small cortical abnormality on the dorsal radius            PROCEDURES PERFORMED:  Cast application    Date/Time: 8/28/2020 2:08 PM  Performed by: Jeremiah Lizama PA-C  Authorized by: Jeremiah Lizama PA-C     Consent:     Consent obtained:  Verbal    Consent given by:  Patient    Risks discussed:  Discoloration, numbness, pain and swelling    Alternatives discussed:  No treatment and delayed treatment  Pre-procedure details:     Sensation:  Normal  Procedure details:     Laterality:  Left    Location:  Wrist    Wrist:  L wristCast type:  Short arm    Supplies:  Cotton padding and fiberglass  Post-procedure details:     Pain: Unchanged    Sensation:  Normal    Patient tolerance of procedure:   Tolerated well, no immediate complications           _____________________________________________________      Faith Duque    I,:   Damon Martinez PA-C am acting as a scribe while in the presence of the attending physician :        I,:   Moi Pat MD personally performed the services described in this documentation    as scribed in my presence :

## 2020-09-11 ENCOUNTER — OFFICE VISIT (OUTPATIENT)
Dept: OBGYN CLINIC | Facility: HOSPITAL | Age: 55
End: 2020-09-11
Payer: COMMERCIAL

## 2020-09-11 ENCOUNTER — HOSPITAL ENCOUNTER (OUTPATIENT)
Dept: RADIOLOGY | Facility: HOSPITAL | Age: 55
Discharge: HOME | End: 2020-09-11
Attending: SURGERY
Payer: COMMERCIAL

## 2020-09-11 VITALS
WEIGHT: 175 LBS | HEIGHT: 73 IN | SYSTOLIC BLOOD PRESSURE: 115 MMHG | DIASTOLIC BLOOD PRESSURE: 79 MMHG | HEART RATE: 93 BPM | BODY MASS INDEX: 23.19 KG/M2

## 2020-09-11 DIAGNOSIS — S52.572A OTHER CLOSED INTRA-ARTICULAR FRACTURE OF DISTAL END OF LEFT RADIUS, INITIAL ENCOUNTER: Primary | ICD-10-CM

## 2020-09-11 DIAGNOSIS — S52.572A OTHER CLOSED INTRA-ARTICULAR FRACTURE OF DISTAL END OF LEFT RADIUS, INITIAL ENCOUNTER: ICD-10-CM

## 2020-09-11 PROCEDURE — 73110 X-RAY EXAM OF WRIST: CPT

## 2020-09-11 PROCEDURE — 99213 OFFICE O/P EST LOW 20 MIN: CPT | Performed by: PHYSICIAN ASSISTANT

## 2020-09-11 PROCEDURE — 29075 APPL CST ELBW FNGR SHORT ARM: CPT | Performed by: PHYSICIAN ASSISTANT

## 2020-09-11 NOTE — PROGRESS NOTES
ASSESSMENT/PLAN:      31-year-old male with small dorsal cortical distal radius fracture, date of injury 08/22/2020  X-rays relatively unchanged from prior films, patient is  over the dorsal distal radius  He was placed back into a short-arm cast today for the next 3 weeks  In 3 weeks we will remove the cast and take another set of x-rays  Likely transition to removable wrist brace and begin therapy for wrist range of motion at that time  The patient verbalized understanding of exam findings and treatment plan  We engaged in the shared decision-making process and treatment options were discussed at length with the patient  Surgical and conservative management discussed today along with risks and benefits  Diagnoses and all orders for this visit:    Other closed intra-articular fracture of distal end of left radius, initial encounter  -     XR wrist 3+ vw left; Future    Other orders  -     Cast application      Follow Up:  Return in about 3 weeks (around 10/2/2020) for Recheck  To Do Next Visit:  Re-evaluation of current issue and X-rays of the  left  wrist    ____________________________________________________________________________________________________________________________________________      CHIEF COMPLAINT:  Chief Complaint   Patient presents with    Left Wrist - Follow-up       SUBJECTIVE:  Matti Guerrero is a 50y o  year old RHD male who presents for follow-up evaluation of a left wrist injury that occurred about 2 weeks ago  Patient reports his wrist was feeling well until the cast was removed and now he notes pain in the wrist   He has been compliant with nonweightbearing restrictions and casting since his last visit  He denies any numbness or tingling  Finger range of motion is intact         I have personally reviewed all the relevant PMH, PSH, SH, FH, Medications and allergies       PAST MEDICAL HISTORY:  Past Medical History:   Diagnosis Date    Chronic back pain  Essential hypertension     Lumbar herniated disc     Sciatica     Seasonal allergies        PAST SURGICAL HISTORY:  Past Surgical History:   Procedure Laterality Date    BACK SURGERY      HAND SURGERY      [place] LAMNOTMY INCL W/DCMPRSN NRV ROOT 1 INTRSPC LUMBR Left 11/7/2016    Procedure: L5-S1 MINIMALLY INVASIVE MICROSCOPIC DISCECTOMY AND POSSIBLE EPIDURAL STEROID INJECTION ;  Surgeon: Mando Bernardo MD;  [facility];  Service: Neurosurgery    WRIST SURGERY         FAMILY HISTORY:  History reviewed  No pertinent family history  SOCIAL HISTORY:  Social History     Tobacco Use    Smoking status: Never Smoker    Smokeless tobacco: Never Used   Substance Use Topics    Alcohol use: No    Drug use: No       MEDICATIONS:    Current Outpatient Medications:     acetaminophen (TYLENOL) 325 mg tablet, Take 650 mg by mouth every 6 (six) hours as needed for mild pain, Disp: , Rfl:     Celecoxib (CELEBREX PO), Take 1 capsule by mouth daily, Disp: , Rfl:     cyclobenzaprine (FLEXERIL) 10 mg tablet, Take 10 mg by mouth 3 (three) times a day as needed for muscle spasms, Disp: , Rfl:     methocarbamol (ROBAXIN) 750 mg tablet, Take 750 mg by mouth 4 (four) times a day as needed for muscle spasms, Disp: , Rfl:     ibuprofen (MOTRIN) 800 mg tablet, Take 1 tablet (800 mg total) by mouth every 8 (eight) hours as needed for mild pain or moderate pain (Patient not taking: Reported on 8/28/2020), Disp: 21 tablet, Rfl: 0    tamsulosin (FLOMAX) 0 4 mg, Take 1 capsule (0 4 mg total) by mouth daily with dinner for 7 days, Disp: 7 capsule, Rfl: 0    ALLERGIES:  No Known Allergies    REVIEW OF SYSTEMS:  Review of Systems   Constitutional: Negative for chills, fatigue, fever and unexpected weight change  HENT: Negative for congestion, dental problem, facial swelling, hearing loss, sneezing, sore throat, trouble swallowing and voice change      Respiratory: Negative for chest tightness, shortness of breath, wheezing and stridor  Cardiovascular: Negative for chest pain, palpitations and leg swelling  Gastrointestinal: Negative for abdominal pain, diarrhea, nausea and vomiting  Endocrine: Negative for cold intolerance and heat intolerance  Musculoskeletal: Positive for arthralgias  Negative for joint swelling, myalgias and neck pain  Skin: Negative for color change, pallor, rash and wound  Neurological: Negative for tremors, facial asymmetry, speech difficulty, weakness and numbness  VITALS:  Vitals:    09/11/20 1451   BP: 115/79   Pulse: 93       LABS:  HgA1c: No results found for: HGBA1C  BMP:   Lab Results   Component Value Date    CALCIUM 9 4 12/05/2019    K 4 0 12/05/2019    CO2 27 12/05/2019     12/05/2019    BUN 17 12/05/2019    CREATININE 1 13 12/05/2019       _____________________________________________________  PHYSICAL EXAMINATION:  General: well developed and well nourished, alert, oriented times 3 and appears comfortable  Psychiatric: Normal  HEENT: Normocephalic, Atraumatic Trachea Midline, No torticollis  Pulmonary: No audible wheezing or respiratory distress   Cardiovascular: No pitting edema, 2+ radial pulse   Skin: No masses, erythema, lacerations, fluctation, ulcerations  Neurovascular: Sensation Intact to the Median, Ulnar, Radial Nerve, Motor Intact to the Median, Ulnar, Radial Nerve and Pulses Intact  Musculoskeletal: Normal, except as noted in detailed exam and in HPI        MUSCULOSKELETAL EXAMINATION:    Left wrist:  No excessive swelling, no ecchymosis or erythema  Tender to palpation over dorsal distal radius in area of suspected fracture  Finger range of motion intact, full composite fist possible  Wrist range of motion deferred due to fracture  Sensation intact to light touch distally  ___________________________________________________  STUDIES REVIEWED:  I have personally reviewed AP lateral and oblique radiographs of left wrist   which demonstrate dorsal cortical abnormality noted on x-ray, stable in appearance compared to prior films from 2 weeks ago           PROCEDURES PERFORMED:  Cast application    Date/Time: 9/11/2020 3:41 PM  Performed by: Aurelio Mittal PA-C  Authorized by: Aurelio Mittal PA-C     Consent:     Consent obtained:  Verbal    Consent given by:  Patient    Risks discussed:  Discoloration, numbness, pain and swelling    Alternatives discussed:  No treatment and delayed treatment  Pre-procedure details:     Sensation:  Normal  Procedure details:     Laterality:  Left    Location:  Wrist    Wrist:  L wristCast type:  Short arm    Supplies:  Cotton padding and fiberglass  Post-procedure details:     Pain:  Unchanged    Sensation:  Normal    Patient tolerance of procedure: Tolerated well, no immediate complications           _____________________________________________________        FEMI Thompson Cosign: I supervised the physician assistant and discussed the case with them  I personally performed history and physical exam  I agree with the assessment and plan of care as documented by the physician assistant

## 2020-10-09 ENCOUNTER — HOSPITAL ENCOUNTER (OUTPATIENT)
Dept: RADIOLOGY | Facility: HOSPITAL | Age: 55
Discharge: HOME | End: 2020-10-09
Attending: SURGERY
Payer: COMMERCIAL

## 2020-10-09 ENCOUNTER — OFFICE VISIT (OUTPATIENT)
Dept: OBGYN CLINIC | Facility: HOSPITAL | Age: 55
End: 2020-10-09
Payer: COMMERCIAL

## 2020-10-09 VITALS
WEIGHT: 186 LBS | DIASTOLIC BLOOD PRESSURE: 92 MMHG | BODY MASS INDEX: 24.54 KG/M2 | HEART RATE: 106 BPM | SYSTOLIC BLOOD PRESSURE: 142 MMHG

## 2020-10-09 DIAGNOSIS — S52.572D OTHER CLOSED INTRA-ARTICULAR FRACTURE OF DISTAL END OF LEFT RADIUS WITH ROUTINE HEALING, SUBSEQUENT ENCOUNTER: Primary | ICD-10-CM

## 2020-10-09 DIAGNOSIS — S52.572D OTHER CLOSED INTRA-ARTICULAR FRACTURE OF DISTAL END OF LEFT RADIUS WITH ROUTINE HEALING, SUBSEQUENT ENCOUNTER: ICD-10-CM

## 2020-10-09 PROCEDURE — 99213 OFFICE O/P EST LOW 20 MIN: CPT | Performed by: SURGERY

## 2020-10-09 PROCEDURE — 73110 X-RAY EXAM OF WRIST: CPT

## 2020-10-16 ENCOUNTER — EVALUATION (OUTPATIENT)
Dept: OCCUPATIONAL THERAPY | Facility: MEDICAL CENTER | Age: 55
End: 2020-10-16
Payer: COMMERCIAL

## 2020-10-16 DIAGNOSIS — S52.92XD: Primary | ICD-10-CM

## 2020-10-16 PROCEDURE — 97165 OT EVAL LOW COMPLEX 30 MIN: CPT | Performed by: OCCUPATIONAL THERAPIST

## 2020-10-16 PROCEDURE — 97110 THERAPEUTIC EXERCISES: CPT | Performed by: OCCUPATIONAL THERAPIST

## 2020-10-16 PROCEDURE — 97140 MANUAL THERAPY 1/> REGIONS: CPT | Performed by: OCCUPATIONAL THERAPIST

## 2020-10-20 ENCOUNTER — OFFICE VISIT (OUTPATIENT)
Dept: OCCUPATIONAL THERAPY | Facility: MEDICAL CENTER | Age: 55
End: 2020-10-20
Payer: COMMERCIAL

## 2020-10-20 DIAGNOSIS — S52.92XD: Primary | ICD-10-CM

## 2020-10-20 PROCEDURE — 97140 MANUAL THERAPY 1/> REGIONS: CPT

## 2020-10-20 PROCEDURE — 97110 THERAPEUTIC EXERCISES: CPT

## 2020-10-20 PROCEDURE — 97530 THERAPEUTIC ACTIVITIES: CPT

## 2020-10-23 ENCOUNTER — OFFICE VISIT (OUTPATIENT)
Dept: OCCUPATIONAL THERAPY | Facility: MEDICAL CENTER | Age: 55
End: 2020-10-23
Payer: COMMERCIAL

## 2020-10-23 DIAGNOSIS — S52.92XD: Primary | ICD-10-CM

## 2020-10-23 PROCEDURE — 97110 THERAPEUTIC EXERCISES: CPT | Performed by: OCCUPATIONAL THERAPIST

## 2020-10-23 PROCEDURE — 97140 MANUAL THERAPY 1/> REGIONS: CPT | Performed by: OCCUPATIONAL THERAPIST

## 2020-10-23 PROCEDURE — 97530 THERAPEUTIC ACTIVITIES: CPT | Performed by: OCCUPATIONAL THERAPIST

## 2020-10-27 ENCOUNTER — OFFICE VISIT (OUTPATIENT)
Dept: OCCUPATIONAL THERAPY | Facility: MEDICAL CENTER | Age: 55
End: 2020-10-27
Payer: COMMERCIAL

## 2020-10-27 DIAGNOSIS — S52.92XD: Primary | ICD-10-CM

## 2020-10-27 PROCEDURE — 97110 THERAPEUTIC EXERCISES: CPT

## 2020-10-27 PROCEDURE — 97530 THERAPEUTIC ACTIVITIES: CPT

## 2020-10-27 PROCEDURE — 97140 MANUAL THERAPY 1/> REGIONS: CPT

## 2020-10-30 ENCOUNTER — OFFICE VISIT (OUTPATIENT)
Dept: OCCUPATIONAL THERAPY | Facility: MEDICAL CENTER | Age: 55
End: 2020-10-30
Payer: COMMERCIAL

## 2020-10-30 DIAGNOSIS — S52.92XD: Primary | ICD-10-CM

## 2020-10-30 PROCEDURE — 97110 THERAPEUTIC EXERCISES: CPT | Performed by: OCCUPATIONAL THERAPIST

## 2020-10-30 PROCEDURE — 97140 MANUAL THERAPY 1/> REGIONS: CPT | Performed by: OCCUPATIONAL THERAPIST

## 2020-10-30 PROCEDURE — 97530 THERAPEUTIC ACTIVITIES: CPT | Performed by: OCCUPATIONAL THERAPIST

## 2020-11-03 ENCOUNTER — OFFICE VISIT (OUTPATIENT)
Dept: OCCUPATIONAL THERAPY | Facility: MEDICAL CENTER | Age: 55
End: 2020-11-03
Payer: COMMERCIAL

## 2020-11-03 DIAGNOSIS — S52.92XD: Primary | ICD-10-CM

## 2020-11-03 PROCEDURE — 97110 THERAPEUTIC EXERCISES: CPT

## 2020-11-03 PROCEDURE — 97140 MANUAL THERAPY 1/> REGIONS: CPT

## 2020-11-03 PROCEDURE — 97530 THERAPEUTIC ACTIVITIES: CPT

## 2020-11-06 ENCOUNTER — OFFICE VISIT (OUTPATIENT)
Dept: OCCUPATIONAL THERAPY | Facility: MEDICAL CENTER | Age: 55
End: 2020-11-06
Payer: COMMERCIAL

## 2020-11-06 DIAGNOSIS — S52.92XD: Primary | ICD-10-CM

## 2020-11-06 PROCEDURE — 97530 THERAPEUTIC ACTIVITIES: CPT | Performed by: OCCUPATIONAL THERAPIST

## 2020-11-06 PROCEDURE — 97140 MANUAL THERAPY 1/> REGIONS: CPT | Performed by: OCCUPATIONAL THERAPIST

## 2020-11-06 PROCEDURE — 97110 THERAPEUTIC EXERCISES: CPT | Performed by: OCCUPATIONAL THERAPIST

## 2020-11-10 ENCOUNTER — OFFICE VISIT (OUTPATIENT)
Dept: OCCUPATIONAL THERAPY | Facility: MEDICAL CENTER | Age: 55
End: 2020-11-10
Payer: COMMERCIAL

## 2020-11-10 DIAGNOSIS — S52.92XD: Primary | ICD-10-CM

## 2020-11-10 PROCEDURE — 97530 THERAPEUTIC ACTIVITIES: CPT

## 2020-11-10 PROCEDURE — 97140 MANUAL THERAPY 1/> REGIONS: CPT

## 2020-11-10 PROCEDURE — 97110 THERAPEUTIC EXERCISES: CPT

## 2020-11-13 ENCOUNTER — OFFICE VISIT (OUTPATIENT)
Dept: OCCUPATIONAL THERAPY | Facility: MEDICAL CENTER | Age: 55
End: 2020-11-13
Payer: COMMERCIAL

## 2020-11-13 DIAGNOSIS — S52.92XD: Primary | ICD-10-CM

## 2020-11-13 PROCEDURE — 97140 MANUAL THERAPY 1/> REGIONS: CPT | Performed by: OCCUPATIONAL THERAPIST

## 2020-11-13 PROCEDURE — 97530 THERAPEUTIC ACTIVITIES: CPT | Performed by: OCCUPATIONAL THERAPIST

## 2020-11-13 PROCEDURE — 97110 THERAPEUTIC EXERCISES: CPT | Performed by: OCCUPATIONAL THERAPIST

## 2020-11-17 ENCOUNTER — OFFICE VISIT (OUTPATIENT)
Dept: OCCUPATIONAL THERAPY | Facility: MEDICAL CENTER | Age: 55
End: 2020-11-17
Payer: COMMERCIAL

## 2020-11-17 DIAGNOSIS — S52.92XD: Primary | ICD-10-CM

## 2020-11-17 PROCEDURE — 97110 THERAPEUTIC EXERCISES: CPT

## 2020-11-17 PROCEDURE — 97530 THERAPEUTIC ACTIVITIES: CPT

## 2020-11-17 PROCEDURE — 97140 MANUAL THERAPY 1/> REGIONS: CPT

## 2020-11-19 ENCOUNTER — OFFICE VISIT (OUTPATIENT)
Dept: OCCUPATIONAL THERAPY | Facility: MEDICAL CENTER | Age: 55
End: 2020-11-19
Payer: COMMERCIAL

## 2020-11-19 DIAGNOSIS — S52.92XD: Primary | ICD-10-CM

## 2020-11-19 PROCEDURE — 97140 MANUAL THERAPY 1/> REGIONS: CPT

## 2020-11-19 PROCEDURE — 97110 THERAPEUTIC EXERCISES: CPT

## 2020-11-19 PROCEDURE — 97530 THERAPEUTIC ACTIVITIES: CPT

## 2020-11-20 ENCOUNTER — OFFICE VISIT (OUTPATIENT)
Dept: OBGYN CLINIC | Facility: HOSPITAL | Age: 55
End: 2020-11-20
Payer: COMMERCIAL

## 2020-11-20 VITALS
DIASTOLIC BLOOD PRESSURE: 85 MMHG | BODY MASS INDEX: 24.52 KG/M2 | HEART RATE: 101 BPM | SYSTOLIC BLOOD PRESSURE: 127 MMHG | HEIGHT: 73 IN | WEIGHT: 185 LBS

## 2020-11-20 DIAGNOSIS — S52.572D OTHER CLOSED INTRA-ARTICULAR FRACTURE OF DISTAL END OF LEFT RADIUS WITH ROUTINE HEALING, SUBSEQUENT ENCOUNTER: Primary | ICD-10-CM

## 2020-11-20 PROCEDURE — 99213 OFFICE O/P EST LOW 20 MIN: CPT | Performed by: SURGERY

## 2020-11-23 ENCOUNTER — TELEPHONE (OUTPATIENT)
Dept: OBGYN CLINIC | Facility: HOSPITAL | Age: 55
End: 2020-11-23

## 2021-09-27 ENCOUNTER — HOSPITAL ENCOUNTER (EMERGENCY)
Facility: HOSPITAL | Age: 56
Discharge: HOME | End: 2021-09-27
Attending: EMERGENCY MEDICINE
Payer: COMMERCIAL

## 2021-09-27 VITALS
SYSTOLIC BLOOD PRESSURE: 141 MMHG | DIASTOLIC BLOOD PRESSURE: 90 MMHG | HEART RATE: 114 BPM | TEMPERATURE: 98.4 F | OXYGEN SATURATION: 97 % | RESPIRATION RATE: 20 BRPM | WEIGHT: 180.12 LBS | BODY MASS INDEX: 23.76 KG/M2

## 2021-09-27 DIAGNOSIS — B34.9 VIRAL SYNDROME: Primary | ICD-10-CM

## 2021-09-27 LAB — SARS-COV-2 RNA RESP QL NAA+PROBE: POSITIVE

## 2021-09-27 PROCEDURE — 99283 EMERGENCY DEPT VISIT LOW MDM: CPT

## 2021-09-27 PROCEDURE — U0003 INFECTIOUS AGENT DETECTION BY NUCLEIC ACID (DNA OR RNA); SEVERE ACUTE RESPIRATORY SYNDROME CORONAVIRUS 2 (SARS-COV-2) (CORONAVIRUS DISEASE [COVID-19]), AMPLIFIED PROBE TECHNIQUE, MAKING USE OF HIGH THROUGHPUT TECHNOLOGIES AS DESCRIBED BY CMS-2020-01-R: HCPCS

## 2021-09-27 PROCEDURE — U0005 INFEC AGEN DETEC AMPLI PROBE: HCPCS

## 2021-09-27 PROCEDURE — 99284 EMERGENCY DEPT VISIT MOD MDM: CPT

## 2021-09-27 RX ORDER — ONDANSETRON 4 MG/1
4 TABLET, ORALLY DISINTEGRATING ORAL ONCE
Status: COMPLETED | OUTPATIENT
Start: 2021-09-27 | End: 2021-09-27

## 2021-09-27 RX ORDER — ACETAMINOPHEN 325 MG/1
650 TABLET ORAL ONCE
Status: COMPLETED | OUTPATIENT
Start: 2021-09-27 | End: 2021-09-27

## 2021-09-27 RX ADMIN — ACETAMINOPHEN 650 MG: 325 TABLET, FILM COATED ORAL at 04:59

## 2021-09-27 RX ADMIN — ONDANSETRON 4 MG: 4 TABLET, ORALLY DISINTEGRATING ORAL at 04:59

## 2021-10-06 ENCOUNTER — TELEPHONE (OUTPATIENT)
Dept: FAMILY MEDICINE CLINIC | Facility: CLINIC | Age: 56
End: 2021-10-06

## 2021-10-06 ENCOUNTER — TELEMEDICINE (OUTPATIENT)
Dept: FAMILY MEDICINE CLINIC | Facility: CLINIC | Age: 56
End: 2021-10-06
Payer: COMMERCIAL

## 2021-10-06 DIAGNOSIS — Z13.1 SCREENING FOR DIABETES MELLITUS: ICD-10-CM

## 2021-10-06 DIAGNOSIS — Z13.29 SCREENING FOR THYROID DISORDER: ICD-10-CM

## 2021-10-06 DIAGNOSIS — Z13.0 SCREENING FOR DEFICIENCY ANEMIA: ICD-10-CM

## 2021-10-06 DIAGNOSIS — Z13.220 SCREENING FOR LIPID DISORDERS: ICD-10-CM

## 2021-10-06 DIAGNOSIS — U07.1 COVID-19: Primary | ICD-10-CM

## 2021-10-06 PROCEDURE — 99213 OFFICE O/P EST LOW 20 MIN: CPT | Performed by: NURSE PRACTITIONER

## 2021-10-06 PROCEDURE — 1036F TOBACCO NON-USER: CPT | Performed by: NURSE PRACTITIONER

## 2021-10-27 ENCOUNTER — TELEPHONE (OUTPATIENT)
Dept: OTHER | Facility: HOSPITAL | Age: 56
End: 2021-10-27

## 2021-10-27 ENCOUNTER — HOSPITAL ENCOUNTER (EMERGENCY)
Facility: HOSPITAL | Age: 56
Discharge: HOME | End: 2021-10-27
Attending: EMERGENCY MEDICINE | Admitting: EMERGENCY MEDICINE
Payer: COMMERCIAL

## 2021-10-27 ENCOUNTER — APPOINTMENT (EMERGENCY)
Dept: CT IMAGING | Facility: HOSPITAL | Age: 56
End: 2021-10-27
Payer: COMMERCIAL

## 2021-10-27 VITALS
HEIGHT: 73 IN | DIASTOLIC BLOOD PRESSURE: 83 MMHG | TEMPERATURE: 97.8 F | OXYGEN SATURATION: 98 % | SYSTOLIC BLOOD PRESSURE: 140 MMHG | WEIGHT: 188.05 LBS | HEART RATE: 75 BPM | BODY MASS INDEX: 24.92 KG/M2 | RESPIRATION RATE: 14 BRPM

## 2021-10-27 DIAGNOSIS — N20.1 RIGHT URETERAL STONE: Primary | ICD-10-CM

## 2021-10-27 LAB
ALBUMIN SERPL BCP-MCNC: 3.7 G/DL (ref 3.5–5)
ALP SERPL-CCNC: 112 U/L (ref 46–116)
ALT SERPL W P-5'-P-CCNC: 41 U/L (ref 12–78)
ANION GAP SERPL CALCULATED.3IONS-SCNC: 10 MMOL/L (ref 4–13)
AST SERPL W P-5'-P-CCNC: 19 U/L (ref 5–45)
BACTERIA UR QL AUTO: ABNORMAL /HPF
BASOPHILS # BLD AUTO: 0.06 THOUSANDS/ΜL (ref 0–0.1)
BASOPHILS NFR BLD AUTO: 1 % (ref 0–1)
BILIRUB SERPL-MCNC: 0.36 MG/DL (ref 0.2–1)
BILIRUB UR QL STRIP: NEGATIVE
BUN SERPL-MCNC: 13 MG/DL (ref 5–25)
CALCIUM SERPL-MCNC: 8.6 MG/DL (ref 8.3–10.1)
CHLORIDE SERPL-SCNC: 106 MMOL/L (ref 100–108)
CLARITY UR: ABNORMAL
CO2 SERPL-SCNC: 26 MMOL/L (ref 21–32)
COLOR UR: YELLOW
CREAT SERPL-MCNC: 0.98 MG/DL (ref 0.6–1.3)
EOSINOPHIL # BLD AUTO: 0.11 THOUSAND/ΜL (ref 0–0.61)
EOSINOPHIL NFR BLD AUTO: 1 % (ref 0–6)
ERYTHROCYTE [DISTWIDTH] IN BLOOD BY AUTOMATED COUNT: 13 % (ref 11.6–15.1)
GFR SERPL CREATININE-BSD FRML MDRD: 87 ML/MIN/1.73SQ M
GLUCOSE SERPL-MCNC: 91 MG/DL (ref 65–140)
GLUCOSE UR STRIP-MCNC: NEGATIVE MG/DL
HCT VFR BLD AUTO: 41.3 % (ref 36.5–49.3)
HGB BLD-MCNC: 14.2 G/DL (ref 12–17)
HGB UR QL STRIP.AUTO: ABNORMAL
IMM GRANULOCYTES # BLD AUTO: 0.05 THOUSAND/UL (ref 0–0.2)
IMM GRANULOCYTES NFR BLD AUTO: 1 % (ref 0–2)
KETONES UR STRIP-MCNC: NEGATIVE MG/DL
LEUKOCYTE ESTERASE UR QL STRIP: NEGATIVE
LYMPHOCYTES # BLD AUTO: 1.57 THOUSANDS/ΜL (ref 0.6–4.47)
LYMPHOCYTES NFR BLD AUTO: 15 % (ref 14–44)
MCH RBC QN AUTO: 29.5 PG (ref 26.8–34.3)
MCHC RBC AUTO-ENTMCNC: 34.4 G/DL (ref 31.4–37.4)
MCV RBC AUTO: 86 FL (ref 82–98)
MONOCYTES # BLD AUTO: 0.81 THOUSAND/ΜL (ref 0.17–1.22)
MONOCYTES NFR BLD AUTO: 8 % (ref 4–12)
MUCOUS THREADS UR QL AUTO: ABNORMAL
NEUTROPHILS # BLD AUTO: 7.95 THOUSANDS/ΜL (ref 1.85–7.62)
NEUTS SEG NFR BLD AUTO: 74 % (ref 43–75)
NITRITE UR QL STRIP: NEGATIVE
NON-SQ EPI CELLS URNS QL MICRO: ABNORMAL /HPF
NRBC BLD AUTO-RTO: 0 /100 WBCS
PH UR STRIP.AUTO: 5.5 [PH] (ref 4.5–8)
PLATELET # BLD AUTO: 276 THOUSANDS/UL (ref 149–390)
PMV BLD AUTO: 11.1 FL (ref 8.9–12.7)
POTASSIUM SERPL-SCNC: 4.1 MMOL/L (ref 3.5–5.3)
PROT SERPL-MCNC: 7.2 G/DL (ref 6.4–8.2)
PROT UR STRIP-MCNC: ABNORMAL MG/DL
RBC # BLD AUTO: 4.81 MILLION/UL (ref 3.88–5.62)
RBC #/AREA URNS AUTO: ABNORMAL /HPF
SODIUM SERPL-SCNC: 142 MMOL/L (ref 136–145)
SP GR UR STRIP.AUTO: 1.02 (ref 1–1.03)
UROBILINOGEN UR QL STRIP.AUTO: 0.2 E.U./DL
WBC # BLD AUTO: 10.55 THOUSAND/UL (ref 4.31–10.16)
WBC #/AREA URNS AUTO: ABNORMAL /HPF

## 2021-10-27 PROCEDURE — 36415 COLL VENOUS BLD VENIPUNCTURE: CPT | Performed by: PHYSICIAN ASSISTANT

## 2021-10-27 PROCEDURE — 99285 EMERGENCY DEPT VISIT HI MDM: CPT | Performed by: PHYSICIAN ASSISTANT

## 2021-10-27 PROCEDURE — 99284 EMERGENCY DEPT VISIT MOD MDM: CPT

## 2021-10-27 PROCEDURE — 74176 CT ABD & PELVIS W/O CONTRAST: CPT

## 2021-10-27 PROCEDURE — 85025 COMPLETE CBC W/AUTO DIFF WBC: CPT | Performed by: PHYSICIAN ASSISTANT

## 2021-10-27 PROCEDURE — 81001 URINALYSIS AUTO W/SCOPE: CPT

## 2021-10-27 PROCEDURE — 80053 COMPREHEN METABOLIC PANEL: CPT | Performed by: PHYSICIAN ASSISTANT

## 2021-10-27 RX ORDER — OXYCODONE HYDROCHLORIDE 5 MG/1
5 TABLET ORAL EVERY 6 HOURS PRN
Qty: 8 TABLET | Refills: 0 | Status: SHIPPED | OUTPATIENT
Start: 2021-10-27 | End: 2021-11-12 | Stop reason: SDUPTHER

## 2021-10-27 RX ORDER — NAPROXEN 250 MG/1
250 TABLET ORAL 2 TIMES DAILY WITH MEALS
Qty: 20 TABLET | Refills: 0 | Status: SHIPPED | OUTPATIENT
Start: 2021-10-27 | End: 2021-11-12 | Stop reason: SDUPTHER

## 2021-10-27 RX ORDER — ONDANSETRON 4 MG/1
4 TABLET, ORALLY DISINTEGRATING ORAL EVERY 8 HOURS PRN
Qty: 20 TABLET | Refills: 0 | Status: SHIPPED | OUTPATIENT
Start: 2021-10-27 | End: 2022-02-14 | Stop reason: ALTCHOICE

## 2021-10-27 RX ORDER — TAMSULOSIN HYDROCHLORIDE 0.4 MG/1
0.4 CAPSULE ORAL
Qty: 21 CAPSULE | Refills: 0 | Status: SHIPPED | OUTPATIENT
Start: 2021-10-27 | End: 2021-11-12 | Stop reason: SDUPTHER

## 2021-11-12 ENCOUNTER — OFFICE VISIT (OUTPATIENT)
Dept: UROLOGY | Facility: CLINIC | Age: 56
End: 2021-11-12
Payer: COMMERCIAL

## 2021-11-12 VITALS
DIASTOLIC BLOOD PRESSURE: 78 MMHG | HEART RATE: 82 BPM | BODY MASS INDEX: 23.72 KG/M2 | HEIGHT: 73 IN | SYSTOLIC BLOOD PRESSURE: 140 MMHG | WEIGHT: 179 LBS

## 2021-11-12 DIAGNOSIS — N20.1 RIGHT URETERAL STONE: ICD-10-CM

## 2021-11-12 PROCEDURE — 99242 OFF/OP CONSLTJ NEW/EST SF 20: CPT | Performed by: NURSE PRACTITIONER

## 2021-11-12 PROCEDURE — 3008F BODY MASS INDEX DOCD: CPT | Performed by: NURSE PRACTITIONER

## 2021-11-12 PROCEDURE — 1036F TOBACCO NON-USER: CPT | Performed by: NURSE PRACTITIONER

## 2021-11-12 RX ORDER — TAMSULOSIN HYDROCHLORIDE 0.4 MG/1
0.4 CAPSULE ORAL
Qty: 30 CAPSULE | Refills: 0 | Status: SHIPPED | OUTPATIENT
Start: 2021-11-12 | End: 2022-02-14 | Stop reason: ALTCHOICE

## 2021-11-12 RX ORDER — NAPROXEN 250 MG/1
250 TABLET ORAL 2 TIMES DAILY WITH MEALS
Qty: 20 TABLET | Refills: 0 | Status: SHIPPED | OUTPATIENT
Start: 2021-11-12 | End: 2022-02-14 | Stop reason: ALTCHOICE

## 2021-11-12 RX ORDER — OXYCODONE HYDROCHLORIDE 5 MG/1
5 TABLET ORAL EVERY 6 HOURS PRN
Qty: 8 TABLET | Refills: 0 | Status: SHIPPED | OUTPATIENT
Start: 2021-11-12 | End: 2022-02-14 | Stop reason: ALTCHOICE

## 2021-12-06 ENCOUNTER — HOSPITAL ENCOUNTER (OUTPATIENT)
Dept: ULTRASOUND IMAGING | Facility: HOSPITAL | Age: 56
Discharge: HOME | End: 2021-12-06
Payer: COMMERCIAL

## 2021-12-06 DIAGNOSIS — N20.1 RIGHT URETERAL STONE: ICD-10-CM

## 2021-12-06 PROCEDURE — 76770 US EXAM ABDO BACK WALL COMP: CPT

## 2022-01-01 ENCOUNTER — HOSPITAL ENCOUNTER (OUTPATIENT)
Dept: CT IMAGING | Facility: HOSPITAL | Age: 57
Discharge: HOME | End: 2022-01-01
Payer: COMMERCIAL

## 2022-01-01 DIAGNOSIS — N20.1 RIGHT URETERAL STONE: ICD-10-CM

## 2022-01-01 PROCEDURE — G1004 CDSM NDSC: HCPCS

## 2022-01-01 PROCEDURE — 74176 CT ABD & PELVIS W/O CONTRAST: CPT

## 2022-01-04 ENCOUNTER — TELEPHONE (OUTPATIENT)
Dept: UROLOGY | Facility: MEDICAL CENTER | Age: 57
End: 2022-01-04

## 2022-01-04 ENCOUNTER — TELEPHONE (OUTPATIENT)
Dept: UROLOGY | Facility: CLINIC | Age: 57
End: 2022-01-04

## 2022-01-04 DIAGNOSIS — N20.1 RIGHT URETERAL STONE: Primary | ICD-10-CM

## 2022-01-04 NOTE — TELEPHONE ENCOUNTER
Seen by AP team in November for acute right ureteral stone  Unfortunately is follow-up CT scan performed this week shows he still has not passed the stone  Last we talked he was fortunately asymptomatic  I do recommend however definitive required ureteroscopy and lithotripsy for this UPJ stone he possibly has developed a stricture or impaction in that area causing it not to pass   I will request an OR date, please arrange an AP h&p visit to discuss surgery

## 2022-01-04 NOTE — TELEPHONE ENCOUNTER
Called patient and left a voicemail for patient to call me back to schedule their surgery  Patient to call me at [phone]

## 2022-01-07 NOTE — TELEPHONE ENCOUNTER
Called patient and left a voicemail for patient to call me back to schedule their surgery  Patient to call me at [phone]

## 2022-01-11 NOTE — TELEPHONE ENCOUNTER
Called patient and left a voicemail for patient to call me back to schedule their surgery  Patient to call me at [phone]

## 2022-01-12 NOTE — TELEPHONE ENCOUNTER
Called patient and left a voicemail for patient to call me back to schedule their surgery  Patient to call me at [phone]

## 2022-01-13 ENCOUNTER — TELEPHONE (OUTPATIENT)
Dept: OTHER | Facility: OTHER | Age: 57
End: 2022-01-13

## 2022-01-13 ENCOUNTER — PREP FOR PROCEDURE (OUTPATIENT)
Dept: UROLOGY | Facility: CLINIC | Age: 57
End: 2022-01-13

## 2022-01-13 DIAGNOSIS — N20.1 RIGHT URETERAL STONE: Primary | ICD-10-CM

## 2022-01-13 NOTE — TELEPHONE ENCOUNTER
Called and spoke with patient to schedule sx  Patient informed of all PAT's needed prior to sx and advised to stop any anticoagulant medication including Multi-vitamins, Fish oil, Krill oil and NSAID, Patient has also been inform that the hospital will call the day before sx with arrival time and procedure time   Patient also informed to have a  bring them to and from hospital     Sx Date: 2/17  Location: [facility]  Physician: Troy Sommers  H&P Date:  1/21  Clearance Date:   Consent: on admit  Pre-cert Added to  list on : 1/13    PAT's Ordered:  yes

## 2022-01-13 NOTE — TELEPHONE ENCOUNTER
Patient is returning a call that he received from the office today to schedule a procedure   He can be reached at [phone]

## 2022-01-13 NOTE — TELEPHONE ENCOUNTER
Called patient and left a voicemail for patient to call me back to schedule their surgery  Patient to call me at [phone]

## 2022-01-21 ENCOUNTER — TELEPHONE (OUTPATIENT)
Dept: UROLOGY | Facility: CLINIC | Age: 57
End: 2022-01-21

## 2022-01-21 ENCOUNTER — OFFICE VISIT (OUTPATIENT)
Dept: UROLOGY | Facility: CLINIC | Age: 57
End: 2022-01-21
Payer: COMMERCIAL

## 2022-01-21 VITALS
HEART RATE: 82 BPM | HEIGHT: 73 IN | BODY MASS INDEX: 26.24 KG/M2 | WEIGHT: 198 LBS | SYSTOLIC BLOOD PRESSURE: 150 MMHG | DIASTOLIC BLOOD PRESSURE: 72 MMHG

## 2022-01-21 DIAGNOSIS — Z12.11 ENCOUNTER FOR SCREENING COLONOSCOPY: ICD-10-CM

## 2022-01-21 DIAGNOSIS — Z01.818 PRE-OP EXAMINATION: Primary | ICD-10-CM

## 2022-01-21 LAB
SL AMB  POCT GLUCOSE, UA: NORMAL
SL AMB LEUKOCYTE ESTERASE,UA: NORMAL
SL AMB POCT BILIRUBIN,UA: NORMAL
SL AMB POCT BLOOD,UA: NORMAL
SL AMB POCT CLARITY,UA: CLEAR
SL AMB POCT COLOR,UA: YELLOW
SL AMB POCT KETONES,UA: NORMAL
SL AMB POCT NITRITE,UA: NORMAL
SL AMB POCT PH,UA: 5
SL AMB POCT SPECIFIC GRAVITY,UA: 1.02
SL AMB POCT URINE PROTEIN: NORMAL
SL AMB POCT UROBILINOGEN: 0.2

## 2022-01-21 PROCEDURE — 1036F TOBACCO NON-USER: CPT | Performed by: PHYSICIAN ASSISTANT

## 2022-01-21 PROCEDURE — 81002 URINALYSIS NONAUTO W/O SCOPE: CPT | Performed by: PHYSICIAN ASSISTANT

## 2022-01-21 PROCEDURE — 99214 OFFICE O/P EST MOD 30 MIN: CPT | Performed by: PHYSICIAN ASSISTANT

## 2022-01-21 PROCEDURE — 3008F BODY MASS INDEX DOCD: CPT | Performed by: PHYSICIAN ASSISTANT

## 2022-01-21 NOTE — H&P (VIEW-ONLY)
Pre-op visit  1/21/2022      Chief Complaint   Patient presents with    Follow-up    Pre-op Exam         Assessment and Plan     49 y o  male managed by Dr Gorge Qureshi    1  Right proximal ureteral calculi    History and physical was performed for the patients upcoming cystoscopy, right ureteroscopy with holmium laser lithotripsy, retrograde pyelogram ureteral stent insertion scheduled for February 17th with Dr Gorge Qureshi  All questions and concerns regarding surgery and perioperative expectations have been addressed and answered  No overall changes in their health since last visit, denies any prior complications with anesthesia  Will proceed with surgery as planned  Reviewed his most recent CT scan from January with him today  He has a 6 mm proximal right ureteral calculus measuring about 6 mm appears to be about 2 cm down from the UPJ  There is another 4-5 mm calculus in the renal pelvis  There are three other smaller calculi scattered throughout the kidney which will attempt to treat  There is a tiny 2 mm left mid-pole calculus which will not be treated  He will have a urine culture two weeks preop  Risks benefits and technique of surgery were described to him today including his CT images and additional drawings  Perioperative expectations including management of stent colic postoperatively were also discussed  He is prepared for surgery and will proceed as planned  History of Present Illness  Kostas Torres is a 49 y o  male here for history and physical prior to their upcoming surgery  Urologic history as below:  Acute renal colic began in October 2021  He never passed a stone  Follow-up imaging shows persistent UPJ calculus and proximal ureteral calculus  He has had mild intermittent pain over the past month  No hematuria  Still has not seen any stone pass  Not currently using any medications for symptoms    This is his first ever stone surgery, he passed a single prior stone about 20 years ago  Review of Systems   Constitutional: Negative for activity change, appetite change, chills, fever and unexpected weight change  HENT: Negative  Respiratory: Negative  Negative for cough and shortness of breath  Cardiovascular: Negative  Negative for chest pain, palpitations and leg swelling  Gastrointestinal: Negative for abdominal pain, diarrhea, nausea and vomiting  Endocrine: Negative  Genitourinary: Positive for flank pain (occasionial)  Negative for decreased urine volume, difficulty urinating, dysuria, frequency, hematuria and urgency  Musculoskeletal: Negative for back pain and gait problem  Skin: Negative  Allergic/Immunologic: Negative  Neurological: Negative  Hematological: Negative for adenopathy  Does not bruise/bleed easily  Vitals  Vitals:    01/21/22 1038   BP: 150/72   BP Location: Left arm   Patient Position: Sitting   Cuff Size: Adult   Pulse: 82   Weight: 89 8 kg (198 lb)   Height: 6' 1" (1 854 m)       Physical Exam  Constitutional:       General: He is not in acute distress  Appearance: Normal appearance  He is not ill-appearing or diaphoretic  HENT:      Head: Normocephalic and atraumatic  Cardiovascular:      Rate and Rhythm: Normal rate and regular rhythm  Pulses: Normal pulses  Heart sounds: Normal heart sounds  Pulmonary:      Effort: Pulmonary effort is normal       Breath sounds: Normal breath sounds  Abdominal:      General: Abdomen is flat  Palpations: Abdomen is soft  Musculoskeletal:      Right lower leg: No edema  Left lower leg: No edema  Skin:     Coloration: Skin is not pale  Findings: No rash  Neurological:      General: No focal deficit present  Mental Status: He is alert  Mental status is at baseline        Gait: Gait normal    Psychiatric:         Mood and Affect: Mood normal              Past Medical History  Past Medical History:   Diagnosis Date    Chronic back pain     Essential hypertension     Lumbar herniated disc     Sciatica     Seasonal allergies        Past Social History  Past Surgical History:   Procedure Laterality Date    BACK SURGERY      HAND SURGERY      [place] LAMNOTMY INCL W/DCMPRSN NRV ROOT 1 INTRSPC LUMBR Left 11/7/2016    Procedure: L5-S1 MINIMALLY INVASIVE MICROSCOPIC DISCECTOMY AND POSSIBLE EPIDURAL STEROID INJECTION ;  Surgeon: Pau Foley MD;  Location[facility];  Service: Neurosurgery    WRIST SURGERY         Past Family History  History reviewed  No pertinent family history      Past Social history  Social History     Socioeconomic History    Marital status: /Civil Union     Spouse name: Not on file    Number of children: Not on file    Years of education: Not on file    Highest education level: Not on file   Occupational History    Not on file   Tobacco Use    Smoking status: Never Smoker    Smokeless tobacco: Never Used   Vaping Use    Vaping Use: Never used   Substance and Sexual Activity    Alcohol use: No    Drug use: No    Sexual activity: Not on file   Other Topics Concern    Not on file   Social History Narrative    Not on file     Social Determinants of Health     Financial Resource Strain: Not on file   Food Insecurity: Not on file   Transportation Needs: Not on file   Physical Activity: Not on file   Stress: Not on file   Social Connections: Not on file   Intimate Partner Violence: Not on file   Housing Stability: Not on file       Current Medications  Current Outpatient Medications   Medication Sig Dispense Refill    naproxen (Naprosyn) 250 mg tablet Take 1 tablet (250 mg total) by mouth 2 (two) times a day with meals 20 tablet 0    ondansetron (ZOFRAN-ODT) 4 mg disintegrating tablet Take 1 tablet (4 mg total) by mouth every 8 (eight) hours as needed for nausea or vomiting for up to 20 doses 20 tablet 0    oxyCODONE (Roxicodone) 5 immediate release tablet Take 1 tablet (5 mg total) by mouth every 6 (six) hours as needed for moderate pain Max Daily Amount: 20 mg 8 tablet 0    tamsulosin (FLOMAX) 0 4 mg Take 1 capsule (0 4 mg total) by mouth daily with dinner 30 capsule 0     No current facility-administered medications for this visit  Allergies  Allergies   Allergen Reactions    Shellfish-Derived Products - Food Allergy Diarrhea         Past Medical History, Social History, Family History, medications and allergies were reviewed

## 2022-01-21 NOTE — PROGRESS NOTES
Pre-op visit  1/21/2022      Chief Complaint   Patient presents with    Follow-up    Pre-op Exam         Assessment and Plan     49 y o  male managed by Dr Phoebe Duenas    1  Right proximal ureteral calculi    History and physical was performed for the patients upcoming cystoscopy, right ureteroscopy with holmium laser lithotripsy, retrograde pyelogram ureteral stent insertion scheduled for February 17th with Dr Phoebe Duenas  All questions and concerns regarding surgery and perioperative expectations have been addressed and answered  No overall changes in their health since last visit, denies any prior complications with anesthesia  Will proceed with surgery as planned  Reviewed his most recent CT scan from January with him today  He has a 6 mm proximal right ureteral calculus measuring about 6 mm appears to be about 2 cm down from the UPJ  There is another 4-5 mm calculus in the renal pelvis  There are three other smaller calculi scattered throughout the kidney which will attempt to treat  There is a tiny 2 mm left mid-pole calculus which will not be treated  He will have a urine culture two weeks preop  Risks benefits and technique of surgery were described to him today including his CT images and additional drawings  Perioperative expectations including management of stent colic postoperatively were also discussed  He is prepared for surgery and will proceed as planned  History of Present Illness  Primo Hudson is a 49 y o  male here for history and physical prior to their upcoming surgery  Urologic history as below:  Acute renal colic began in October 2021  He never passed a stone  Follow-up imaging shows persistent UPJ calculus and proximal ureteral calculus  He has had mild intermittent pain over the past month  No hematuria  Still has not seen any stone pass  Not currently using any medications for symptoms    This is his first ever stone surgery, he passed a single prior stone about 20 years ago  Review of Systems   Constitutional: Negative for activity change, appetite change, chills, fever and unexpected weight change  HENT: Negative  Respiratory: Negative  Negative for cough and shortness of breath  Cardiovascular: Negative  Negative for chest pain, palpitations and leg swelling  Gastrointestinal: Negative for abdominal pain, diarrhea, nausea and vomiting  Endocrine: Negative  Genitourinary: Positive for flank pain (occasionial)  Negative for decreased urine volume, difficulty urinating, dysuria, frequency, hematuria and urgency  Musculoskeletal: Negative for back pain and gait problem  Skin: Negative  Allergic/Immunologic: Negative  Neurological: Negative  Hematological: Negative for adenopathy  Does not bruise/bleed easily  Vitals  Vitals:    01/21/22 1038   BP: 150/72   BP Location: Left arm   Patient Position: Sitting   Cuff Size: Adult   Pulse: 82   Weight: 89 8 kg (198 lb)   Height: 6' 1" (1 854 m)       Physical Exam  Constitutional:       General: He is not in acute distress  Appearance: Normal appearance  He is not ill-appearing or diaphoretic  HENT:      Head: Normocephalic and atraumatic  Cardiovascular:      Rate and Rhythm: Normal rate and regular rhythm  Pulses: Normal pulses  Heart sounds: Normal heart sounds  Pulmonary:      Effort: Pulmonary effort is normal       Breath sounds: Normal breath sounds  Abdominal:      General: Abdomen is flat  Palpations: Abdomen is soft  Musculoskeletal:      Right lower leg: No edema  Left lower leg: No edema  Skin:     Coloration: Skin is not pale  Findings: No rash  Neurological:      General: No focal deficit present  Mental Status: He is alert  Mental status is at baseline        Gait: Gait normal    Psychiatric:         Mood and Affect: Mood normal              Past Medical History  Past Medical History:   Diagnosis Date    Chronic back pain     Essential hypertension     Lumbar herniated disc     Sciatica     Seasonal allergies        Past Social History  Past Surgical History:   Procedure Laterality Date    BACK SURGERY      HAND SURGERY      [place] LAMNOTMY INCL W/DCMPRSN NRV ROOT 1 INTRSPC LUMBR Left 11/7/2016    Procedure: L5-S1 MINIMALLY INVASIVE MICROSCOPIC DISCECTOMY AND POSSIBLE EPIDURAL STEROID INJECTION ;  Surgeon: Ramirez Santacruz MD;  [facility];  Service: Neurosurgery    WRIST SURGERY         Past Family History  History reviewed  No pertinent family history      Past Social history  Social History     Socioeconomic History    Marital status: /Civil Union     Spouse name: Not on file    Number of children: Not on file    Years of education: Not on file    Highest education level: Not on file   Occupational History    Not on file   Tobacco Use    Smoking status: Never Smoker    Smokeless tobacco: Never Used   Vaping Use    Vaping Use: Never used   Substance and Sexual Activity    Alcohol use: No    Drug use: No    Sexual activity: Not on file   Other Topics Concern    Not on file   Social History Narrative    Not on file     Social Determinants of Health     Financial Resource Strain: Not on file   Food Insecurity: Not on file   Transportation Needs: Not on file   Physical Activity: Not on file   Stress: Not on file   Social Connections: Not on file   Intimate Partner Violence: Not on file   Housing Stability: Not on file       Current Medications  Current Outpatient Medications   Medication Sig Dispense Refill    naproxen (Naprosyn) 250 mg tablet Take 1 tablet (250 mg total) by mouth 2 (two) times a day with meals 20 tablet 0    ondansetron (ZOFRAN-ODT) 4 mg disintegrating tablet Take 1 tablet (4 mg total) by mouth every 8 (eight) hours as needed for nausea or vomiting for up to 20 doses 20 tablet 0    oxyCODONE (Roxicodone) 5 immediate release tablet Take 1 tablet (5 mg total) by mouth every 6 (six) hours as needed for moderate pain Max Daily Amount: 20 mg 8 tablet 0    tamsulosin (FLOMAX) 0 4 mg Take 1 capsule (0 4 mg total) by mouth daily with dinner 30 capsule 0     No current facility-administered medications for this visit  Allergies  Allergies   Allergen Reactions    Shellfish-Derived Products - Food Allergy Diarrhea         Past Medical History, Social History, Family History, medications and allergies were reviewed

## 2022-01-21 NOTE — TELEPHONE ENCOUNTER
Hi,    Patient stated he received the packet for surgery via e-mail, but lost it, can you please resend to patient's e-mail below: [email or web address]  Thank you!

## 2022-02-03 ENCOUNTER — APPOINTMENT (OUTPATIENT)
Dept: LAB | Facility: MEDICAL CENTER | Age: 57
End: 2022-02-03
Payer: COMMERCIAL

## 2022-02-03 PROCEDURE — 87086 URINE CULTURE/COLONY COUNT: CPT

## 2022-02-04 LAB — BACTERIA UR CULT: NORMAL

## 2022-02-14 NOTE — PRE-PROCEDURE INSTRUCTIONS
No outpatient medications have been marked as taking for the [facility])  Pt does not take any daily medications  My Surgical Experience    The following information was developed to assist you to prepare for your operation  What do I need to do before coming to the hospital?   Arrange for a responsible person to drive you to and from the hospital    Arrange care for your children at home  Children are not allowed in the recovery areas of the hospital   Plan to wear clothing that is easy to put on and take off  If you are having shoulder surgery, wear a shirt that buttons or zippers in the front  Bathing  o Shower the evening before and the morning of your surgery with an antibacterial soap  Please refer to the Pre Op Showering Instructions for Surgery Patients Sheet   o Remove nail polish and all body piercing jewelry  o Do not shave any body part for at least 24 hours before surgery-this includes face, arms, legs and upper body  Food  o Nothing to eat or drink after midnight the night before your surgery  This includes candy and chewing gum  o Exception: If your surgery is after 12:00pm (noon), you may have clear liquids such as 7-Up®, ginger ale, apple or cranberry juice, Jell-O®, water, or clear broth until 8:00 am  o Do not drink milk or juice with pulp on the morning before surgery  o Do not drink alcohol 24 hours before surgery  Medicine  o Follow instructions you received from your surgeon about which medicines you may take on the day of surgery  o If instructed to take medicine on the morning of surgery, take pills with just a small sip of water  Call your prescribing doctor for specific infroamtion on what to do if you take insulin    What should I bring to the hospital?    Bring:  Gregory Nugent or a walker, if you have them, for foot or knee surgery   A list of the daily medicines, vitamins, minerals, herbals and nutritional supplements you take   Include the dosages of medicines and the time you take them each day   Glasses, dentures or hearing aids   Minimal clothing; you will be wearing hospital sleepwear   Photo ID; required to verify your identity   If you have a Living Will or Power of , bring a copy of the documents   If you have an ostomy, bring an extra pouch and any supplies you use    Do not bring   Medicines or inhalers   Money, valuables or jewelry    What other information should I know about the day of surgery?  Notify your surgeons if you develop a cold, sore throat, cough, fever, rash or any other illness   Report to the Ambulatory Surgical/Same Day Surgery Unit   You will be instructed to stop at Registration only if you have not been pre-registered   Inform your  fi they do not stay that they will be asked by the staff to leave a phone number where they can be reached   Be available to be reached before surgery  In the event the operating room schedule changes, you may be asked to come in earlier or later than expected    *It is important to tell your doctor and others involved in your health care if you are taking or have been taking any non-prescription drugs, vitamins, minerals, herbals or other nutritional supplements   Any of these may interact with some food or medicines and cause a reaction

## 2022-02-16 ENCOUNTER — ANESTHESIA EVENT (OUTPATIENT)
Dept: PERIOP | Facility: HOSPITAL | Age: 57
End: 2022-02-16
Payer: COMMERCIAL

## 2022-02-17 ENCOUNTER — HOSPITAL ENCOUNTER (OUTPATIENT)
Facility: HOSPITAL | Age: 57
Setting detail: OUTPATIENT SURGERY
Discharge: HOME | End: 2022-02-17
Attending: UROLOGY | Admitting: UROLOGY
Payer: COMMERCIAL

## 2022-02-17 ENCOUNTER — TELEPHONE (OUTPATIENT)
Dept: UROLOGY | Facility: CLINIC | Age: 57
End: 2022-02-17

## 2022-02-17 ENCOUNTER — ANESTHESIA (OUTPATIENT)
Dept: PERIOP | Facility: HOSPITAL | Age: 57
End: 2022-02-17
Payer: COMMERCIAL

## 2022-02-17 ENCOUNTER — APPOINTMENT (OUTPATIENT)
Dept: RADIOLOGY | Facility: HOSPITAL | Age: 57
End: 2022-02-17
Payer: COMMERCIAL

## 2022-02-17 VITALS
DIASTOLIC BLOOD PRESSURE: 91 MMHG | WEIGHT: 182.76 LBS | BODY MASS INDEX: 24.22 KG/M2 | TEMPERATURE: 98 F | SYSTOLIC BLOOD PRESSURE: 155 MMHG | OXYGEN SATURATION: 97 % | HEART RATE: 85 BPM | RESPIRATION RATE: 16 BRPM | HEIGHT: 73 IN

## 2022-02-17 DIAGNOSIS — N20.1 RIGHT URETERAL STONE: ICD-10-CM

## 2022-02-17 DIAGNOSIS — N20.1 RIGHT URETERAL STONE: Primary | ICD-10-CM

## 2022-02-17 PROBLEM — U07.1 COVID: Status: ACTIVE | Noted: 2021-09-01

## 2022-02-17 PROCEDURE — C1758 CATHETER, URETERAL: HCPCS | Performed by: UROLOGY

## 2022-02-17 PROCEDURE — C2617 STENT, NON-COR, TEM W/O DEL: HCPCS | Performed by: UROLOGY

## 2022-02-17 PROCEDURE — 52356 CYSTO/URETERO W/LITHOTRIPSY: CPT | Performed by: UROLOGY

## 2022-02-17 PROCEDURE — 74420 UROGRAPHY RTRGR +-KUB: CPT

## 2022-02-17 PROCEDURE — 82360 CALCULUS ASSAY QUANT: CPT | Performed by: UROLOGY

## 2022-02-17 PROCEDURE — C1894 INTRO/SHEATH, NON-LASER: HCPCS | Performed by: UROLOGY

## 2022-02-17 PROCEDURE — C1769 GUIDE WIRE: HCPCS | Performed by: UROLOGY

## 2022-02-17 DEVICE — INLAY OPTIMA URETERAL STENT W/O GUIDEWIRE
Type: IMPLANTABLE DEVICE | Site: URETER | Status: FUNCTIONAL
Brand: BARD® INLAY OPTIMA® URETERAL STENT

## 2022-02-17 RX ORDER — PROPOFOL 10 MG/ML
INJECTION, EMULSION INTRAVENOUS AS NEEDED
Status: DISCONTINUED | OUTPATIENT
Start: 2022-02-17 | End: 2022-02-17

## 2022-02-17 RX ORDER — ONDANSETRON 2 MG/ML
INJECTION INTRAMUSCULAR; INTRAVENOUS AS NEEDED
Status: DISCONTINUED | OUTPATIENT
Start: 2022-02-17 | End: 2022-02-17

## 2022-02-17 RX ORDER — PHENAZOPYRIDINE HYDROCHLORIDE 100 MG/1
100 TABLET, FILM COATED ORAL ONCE
Status: COMPLETED | OUTPATIENT
Start: 2022-02-17 | End: 2022-02-17

## 2022-02-17 RX ORDER — MIDAZOLAM HYDROCHLORIDE 2 MG/2ML
INJECTION, SOLUTION INTRAMUSCULAR; INTRAVENOUS AS NEEDED
Status: DISCONTINUED | OUTPATIENT
Start: 2022-02-17 | End: 2022-02-17

## 2022-02-17 RX ORDER — TAMSULOSIN HYDROCHLORIDE 0.4 MG/1
0.4 CAPSULE ORAL ONCE
Status: COMPLETED | OUTPATIENT
Start: 2022-02-17 | End: 2022-02-17

## 2022-02-17 RX ORDER — MAGNESIUM HYDROXIDE 1200 MG/15ML
LIQUID ORAL AS NEEDED
Status: DISCONTINUED | OUTPATIENT
Start: 2022-02-17 | End: 2022-02-17 | Stop reason: HOSPADM

## 2022-02-17 RX ORDER — HYDROMORPHONE HCL/PF 1 MG/ML
0.5 SYRINGE (ML) INJECTION
Status: DISCONTINUED | OUTPATIENT
Start: 2022-02-17 | End: 2022-02-17 | Stop reason: HOSPADM

## 2022-02-17 RX ORDER — LIDOCAINE HYDROCHLORIDE 10 MG/ML
INJECTION, SOLUTION EPIDURAL; INFILTRATION; INTRACAUDAL; PERINEURAL AS NEEDED
Status: DISCONTINUED | OUTPATIENT
Start: 2022-02-17 | End: 2022-02-17

## 2022-02-17 RX ORDER — SODIUM CHLORIDE 9 MG/ML
125 INJECTION, SOLUTION INTRAVENOUS CONTINUOUS
Status: DISCONTINUED | OUTPATIENT
Start: 2022-02-17 | End: 2022-02-17 | Stop reason: HOSPADM

## 2022-02-17 RX ORDER — TAMSULOSIN HYDROCHLORIDE 0.4 MG/1
0.4 CAPSULE ORAL
Qty: 90 CAPSULE | Refills: 0 | Status: SHIPPED | OUTPATIENT
Start: 2022-02-17 | End: 2022-05-24 | Stop reason: ALTCHOICE

## 2022-02-17 RX ORDER — PHENAZOPYRIDINE HYDROCHLORIDE 100 MG/1
100 TABLET, FILM COATED ORAL 3 TIMES DAILY PRN
Qty: 20 TABLET | Refills: 0 | Status: SHIPPED | OUTPATIENT
Start: 2022-02-17 | End: 2022-05-24 | Stop reason: ALTCHOICE

## 2022-02-17 RX ORDER — CEFAZOLIN SODIUM 2 G/50ML
2000 SOLUTION INTRAVENOUS ONCE
Status: DISCONTINUED | OUTPATIENT
Start: 2022-02-17 | End: 2022-02-17 | Stop reason: HOSPADM

## 2022-02-17 RX ORDER — FENTANYL CITRATE 50 UG/ML
INJECTION, SOLUTION INTRAMUSCULAR; INTRAVENOUS AS NEEDED
Status: DISCONTINUED | OUTPATIENT
Start: 2022-02-17 | End: 2022-02-17

## 2022-02-17 RX ORDER — HYDROCODONE BITARTRATE AND ACETAMINOPHEN 5; 325 MG/1; MG/1
1 TABLET ORAL EVERY 6 HOURS PRN
Status: DISCONTINUED | OUTPATIENT
Start: 2022-02-17 | End: 2022-02-17 | Stop reason: HOSPADM

## 2022-02-17 RX ORDER — FENTANYL CITRATE/PF 50 MCG/ML
50 SYRINGE (ML) INJECTION
Status: DISCONTINUED | OUTPATIENT
Start: 2022-02-17 | End: 2022-02-17 | Stop reason: HOSPADM

## 2022-02-17 RX ORDER — ONDANSETRON 2 MG/ML
4 INJECTION INTRAMUSCULAR; INTRAVENOUS ONCE AS NEEDED
Status: DISCONTINUED | OUTPATIENT
Start: 2022-02-17 | End: 2022-02-17 | Stop reason: HOSPADM

## 2022-02-17 RX ORDER — MEPERIDINE HYDROCHLORIDE 25 MG/ML
12.5 INJECTION INTRAMUSCULAR; INTRAVENOUS; SUBCUTANEOUS ONCE AS NEEDED
Status: DISCONTINUED | OUTPATIENT
Start: 2022-02-17 | End: 2022-02-17 | Stop reason: HOSPADM

## 2022-02-17 RX ORDER — CEFUROXIME AXETIL 250 MG/1
250 TABLET ORAL EVERY 12 HOURS SCHEDULED
Qty: 6 TABLET | Refills: 0 | Status: SHIPPED | OUTPATIENT
Start: 2022-02-17 | End: 2022-02-19

## 2022-02-17 RX ORDER — CEFAZOLIN SODIUM 2 G/50ML
SOLUTION INTRAVENOUS AS NEEDED
Status: DISCONTINUED | OUTPATIENT
Start: 2022-02-17 | End: 2022-02-17

## 2022-02-17 RX ORDER — DEXAMETHASONE SODIUM PHOSPHATE 10 MG/ML
INJECTION, SOLUTION INTRAMUSCULAR; INTRAVENOUS AS NEEDED
Status: DISCONTINUED | OUTPATIENT
Start: 2022-02-17 | End: 2022-02-17

## 2022-02-17 RX ORDER — PROMETHAZINE HYDROCHLORIDE 25 MG/ML
6.25 INJECTION, SOLUTION INTRAMUSCULAR; INTRAVENOUS ONCE AS NEEDED
Status: DISCONTINUED | OUTPATIENT
Start: 2022-02-17 | End: 2022-02-17 | Stop reason: HOSPADM

## 2022-02-17 RX ORDER — LIDOCAINE HYDROCHLORIDE 20 MG/ML
JELLY TOPICAL AS NEEDED
Status: DISCONTINUED | OUTPATIENT
Start: 2022-02-17 | End: 2022-02-17 | Stop reason: HOSPADM

## 2022-02-17 RX ADMIN — FENTANYL CITRATE 50 MCG: 50 INJECTION INTRAMUSCULAR; INTRAVENOUS at 11:37

## 2022-02-17 RX ADMIN — MIDAZOLAM 2 MG: 1 INJECTION INTRAMUSCULAR; INTRAVENOUS at 11:25

## 2022-02-17 RX ADMIN — HYDROCODONE BITARTRATE AND ACETAMINOPHEN 1 TABLET: 5; 325 TABLET ORAL at 13:49

## 2022-02-17 RX ADMIN — PHENAZOPYRIDINE HYDROCHLORIDE 100 MG: 100 TABLET ORAL at 13:49

## 2022-02-17 RX ADMIN — PROPOFOL 200 MG: 10 INJECTION, EMULSION INTRAVENOUS at 11:31

## 2022-02-17 RX ADMIN — PROPOFOL 50 MG: 10 INJECTION, EMULSION INTRAVENOUS at 11:36

## 2022-02-17 RX ADMIN — LIDOCAINE HYDROCHLORIDE 100 MG: 10 INJECTION, SOLUTION EPIDURAL; INFILTRATION; INTRACAUDAL; PERINEURAL at 11:31

## 2022-02-17 RX ADMIN — ONDANSETRON 4 MG: 2 INJECTION INTRAMUSCULAR; INTRAVENOUS at 12:11

## 2022-02-17 RX ADMIN — SODIUM CHLORIDE 125 ML/HR: 0.9 INJECTION, SOLUTION INTRAVENOUS at 09:18

## 2022-02-17 RX ADMIN — CEFAZOLIN SODIUM 2000 MG: 2 SOLUTION INTRAVENOUS at 11:30

## 2022-02-17 RX ADMIN — TAMSULOSIN HYDROCHLORIDE 0.4 MG: 0.4 CAPSULE ORAL at 13:49

## 2022-02-17 RX ADMIN — DEXAMETHASONE SODIUM PHOSPHATE 6 MG: 10 INJECTION INTRAMUSCULAR; INTRAVENOUS at 11:43

## 2022-02-17 RX ADMIN — FENTANYL CITRATE 50 MCG: 50 INJECTION INTRAMUSCULAR; INTRAVENOUS at 11:46

## 2022-02-17 NOTE — DISCHARGE INSTRUCTIONS
You have stent on a string, which will be removed in 3-5 days  Please take care not to remove with dressing or undressing  Our office staff will contact you to arrange an appointment for removal     It is common to have some mild symptoms from the stent placement  Pain with urination, feeling of needing to urinate frequency or urgently, flank discomfort or blood in the urine  Utilize the medications provided (flomax, ditropan, pyridium) as well as ibuprofen/motrin for pain control  Hydrate liberally with oral fluids  Ureteroscopy   WHAT YOU NEED TO KNOW:   A ureteroscopy is a procedure to examine in the inside of your urinary tract, which includes your urethra, bladder, ureters, and kidneys  A ureteroscope is a small, thin tube with a light and camera on the end  Ureteroscopy can help your healthcare provider diagnose and treat problems in your urinary tract, such as kidney stones  DISCHARGE INSTRUCTIONS:   Medicine:   · Antibiotics  may be given to treat or prevent an infection  · Take your medicine as directed  Contact your healthcare provider if you think your medicine is not helping or if you have side effects  Tell him or her if you are allergic to any medicine  Keep a list of the medicines, vitamins, and herbs you take  Include the amounts, and when and why you take them  Bring the list or the pill bottles to follow-up visits  Carry your medicine list with you in case of an emergency  Follow up with your healthcare provider as directed:  Write down your questions so you remember to ask them during your visits  Drink liquids as directed  Liquids can help prevent kidney stones and urinary tract infections  Drink water and limit the amount of caffeine you drink  Caffeine may be found in coffee, tea, soda, sports drinks, and foods  Ask your healthcare provider how much liquid to drink each day  Contact your healthcare provider if:   · You have a fever  · You cannot urinate      · You have blood in your urine  · You are vomiting  · You have pain in your abdomen or side  · You have questions or concerns about your condition or care  © Copyright [employer] 2018 Information is for End User's use only and may not be sold, redistributed or otherwise used for commercial purposes  All illustrations and images included in CareNotes® are the copyrighted property of [employer]  or [address] [place], [place] [address]eveline bandar   The above information is an  only  It is not intended as medical advice for individual conditions or treatments  Talk to your doctor, nurse or pharmacist before following any medical regimen to see if it is safe and effective for you

## 2022-02-17 NOTE — ANESTHESIA PREPROCEDURE EVALUATION
Procedure:  CYSTO URETEROSCOPY W/ LITHO HOLMIUM LASER, RETROGRADE PYELOGRAM, STENT URETERAL (Right Bladder)    Relevant Problems   CARDIO   (+) Essential hypertension      Other   (+) Right ureteral stone        Physical Exam    Airway    Mallampati score: II  TM Distance: >3 FB  Neck ROM: full     Dental   No notable dental hx     Cardiovascular  Rhythm: regular, Rate: normal, Cardiovascular exam normal    Pulmonary  Pulmonary exam normal Breath sounds clear to auscultation,     Other Findings        Anesthesia Plan  ASA Score- 2     Anesthesia Type- general with ASA Monitors  Additional Monitors:   Airway Plan: LMA  Plan Factors-Exercise tolerance (METS): >4 METS  Chart reviewed  Patient summary reviewed  Patient is not a current smoker  Patient instructed to abstain from smoking on day of procedure  Patient did not smoke on day of surgery  Induction- intravenous  Postoperative Plan- Plan for postoperative opioid use  Planned trial extubation    Informed Consent- Anesthetic plan and risks discussed with patient and spouse

## 2022-02-17 NOTE — OP NOTE
PERATIVE REPORT  PATIENT NAME: Jimenez Groves    :  1965  MRN: [id number]  Pt Location: [facility] OR ROOM 03    SURGERY DATE: 2022    Surgeon(s) and Role:     * Letty Dowd MD - Primary    Preop Diagnosis:  Right ureteral stone [N20 1]    Post-Op Diagnosis Codes:     * Right ureteral stone [N20 1]    Procedure(s) (LRB):  CYSTO URETEROSCOPY W/ LITHO HOLMIUM LASER, RETROGRADE PYELOGRAM, RIGHT URETERAL STENT PLACEMENT (Right)    Specimen(s):  ID Type Source Tests Collected by Time Destination   A :  Calculus Ureter, Right STONE ANALYSIS Letty Dowd MD 2022 1226        Estimated Blood Loss:   Minimal    Drains:  Ureteral Drain/Stent Right ureter 6 Fr  (Active)   Number of days: 0       Anesthesia Type:   General    Operative Indications:  Right ureteral stone [N20 1]      Operative Findings:  1  Mid to proximal ureteral stones seen with semi rigid ureteral scope, fractured with high-powered laser  2  Once flexible scope place, more proximal ureter evaluated with again ureteral stone noted, fractured and retrieved  3  Small stones in a midpole calyx were fractured and anything larger than 2 mm was removed  4  Renal pelvis noted to be severely dilated from chronic obstruction  5  Area in the proximal ureter was noted to have signs of chronic inflammation  6  Patient had bulbar ureteral stricture noted at the start and completion of the case    Complications:   None    Procedure and Technique:  Jimenez Groves is a 49y o -year-old male  with a history of prior emergency room visits for stone disease  Patient did not follow with Urology  More recently presents with proximal right-sided ureteral stones noted  Risk and benefits of ureteroscopy were discussed and reviewed  Informed consent was obtained  The patient was brought to the operating room on 2022   After the smooth induction of LMA anesthesia, the patient was placed in the dorsal lithotomy position    His genitalia was prepped and draped in a sterile fashion  Intravenous antibiotics were administered in the form of Ancef  A timeout was performed with all members of the operative team confirmed the patient's identity, procedure to be performed, and laterality of the case  A 22 Armenian rigid cystoscope with 30° lens was inserted  Patient was noted to have a bulbar urethral stricture that was passable with the scope  The bladder was thoroughly inspected  It was no evidence of mucosal abnormalities or lesions  There were no calculi identified  Attention was focused on the right ureteral orifice   flouroscopy demonstrated a non radioopaque stone  A Bard Solo Plus wire was passed proximal to the stone and secured as a safety  A dual lumen catheter was then advanced over the wire and used to dilate the very distal ureter under direct vision  A retrograde pyelogram was performed that demonstrated moderate to severe hydronephrosis  The dual-lumen was removed leaving the safety wire in place  A long semirigid ureteroscope was then inserted adjacent to the wire  The stone was identified in the mid ureter and was engaged and decimated with a 272nm  holmium laser fiber with high-powered laser  The fragments were removed with an Dimension basket  Unfortunately, we could not advance the long semi rigid scope into the proximal ureter so a 2nd wire was placed and the semi rigid ureteral scope was removed  A 10/12 space 35 cm ureteral access sheath was then passed into the mid ureter  Inner cannula was removed  Single use Bard flexible ureteral scope was passed through the sheath and into the proximal ureter  Another large obstructing stone was seen  This again was fragmented with laser with the small fragments removed with dimension basket  We were then easily able to pass into the renal pelvis    The calices appeared widely dilated which confirmed the appearance of the retrograde pyelogram   In the interpolar region, there were some small stone fragments with Hilario's plaque  Again using the laser, these were fragmented into small less than 2 mm calculi  Anything large enough to be grasped with the basket was retrieved  The ureteroscope was then removed along with the sheath in a pullout fashion  No additional stones were noted in the proximal ureter  We did note some signs of chronic inflammation of the proximal ureter where the stones had been impacted  The wire was backloaded through the cystoscope  The cystoscope was repassed into the bladder  A 6 Angolan 26 double-J ureteral stent was then placed over the previously banked safety wire without difficulty  The proximal coil was appreciated in the right renal pelvis and the distal coil was visualized within the bladder  The bladder was emptied and the cystoscope was removed  A string was left in place and secured to the dorsal penis with Tegaderm  2% viscous lidocaine was placed per urethra  Overall the patient tolerated the procedure well and were no complications  The patient was extubated in the operating room and transferred to the PACU in stable condition at the conclusion of the case      Plan-the patient's stent on string will be removed in 3-5 days and he will follow-up in 3 months with x-ray and ultrasound    Patient Disposition:  PACU       SIGNATURE: Peggyann Bosworth, MD  DATE: February 17, 2022  TIME: 12:44 PM

## 2022-02-17 NOTE — INTERVAL H&P NOTE
H&P reviewed  After examining the patient I find no changes in the patients condition since the H&P had been written  Discussed with patient and wife plan for OR, cystoscopy, [RIGHT] retrograde pyelogram, ureteroscopy with possible laser lithotripsy and basket extraction of stone, stent placement  Informed consent signed        Vitals:    02/17/22 0901   BP: 166/95   Pulse: 100   Resp: 16   Temp: 98 4 °F (36 9 °C)   SpO2: 97%

## 2022-02-17 NOTE — ANESTHESIA POSTPROCEDURE EVALUATION
Post-Op Assessment Note    CV Status:  Stable    Pain management: adequate     Mental Status:  Alert and awake   Hydration Status:  Euvolemic   PONV Controlled:  Controlled   Airway Patency:  Patent      Post Op Vitals Reviewed: Yes      Staff: Anesthesiologist         No complications documented      /91   Pulse 85   Temp 98 °F (36 7 °C) (Temporal)   Resp 16   Ht 6' 1" (1 854 m)   Wt 82 9 kg (182 lb 12 2 oz)   SpO2 97%   BMI 24 11 kg/m²

## 2022-02-18 NOTE — TELEPHONE ENCOUNTER
Called and spoke with patient  He was scheduled for stent with string removal Monday in the [facility] @ 0930  Also scheduled for 3 month f/u with AP on 5/24/22  He was provided with central scheduling phone number and will call to arrange US prior to 3 month f/u  Aware to have x-ray completed in 3 months as well

## 2022-02-21 ENCOUNTER — PROCEDURE VISIT (OUTPATIENT)
Dept: UROLOGY | Facility: CLINIC | Age: 57
End: 2022-02-21

## 2022-02-21 VITALS
BODY MASS INDEX: 24.12 KG/M2 | SYSTOLIC BLOOD PRESSURE: 150 MMHG | HEART RATE: 64 BPM | DIASTOLIC BLOOD PRESSURE: 90 MMHG | WEIGHT: 182 LBS | RESPIRATION RATE: 20 BRPM | HEIGHT: 73 IN

## 2022-02-21 DIAGNOSIS — N20.0 CALCULUS OF KIDNEY: Primary | ICD-10-CM

## 2022-02-21 PROCEDURE — 3008F BODY MASS INDEX DOCD: CPT | Performed by: PHYSICIAN ASSISTANT

## 2022-02-21 PROCEDURE — 99024 POSTOP FOLLOW-UP VISIT: CPT

## 2022-02-21 NOTE — PROGRESS NOTES
2/21/2022  Farrukh Gamez is a 49 y o  male  [id number]        Diagnosis  Chief Complaint     Nephrolithiasis; Right Stent Removal          Patient is s/p right ureteroscopy with stone extraction on 02/17/2022 with Dr Ashly Pang  Patient to return in 3 months for AP visit  KUB/USK to be obtained prior to office visit  Procedure Stent with String Removal    Vitals:    02/21/22 0923   BP: 150/90   Pulse: 64   Resp: 20   Weight: 82 6 kg (182 lb)   Height: 6' 1" (1 854 m)       Stent with string removed intact without difficulty  Reviewed post stent removal symptoms including flank pain, dysuria, and hematuria  Instructed patient to increase oral fluid intake  Encouraged the use of NSAIDS and other prescribed pain medication as needed for discomfort  Patient instructed to call the office or report to the ER for uncontrolled pain, fever, chills, nausea or vomiting         Shante Massey RN

## 2022-02-22 LAB
CALCIUM OXALATE DIHYDRATE MFR STONE IR: 10 %
COLOR STONE: NORMAL
COM MFR STONE: 90 %
COMMENT-STONE3: NORMAL
COMPOSITION: NORMAL
LABORATORY COMMENT REPORT: NORMAL
PHOTO: NORMAL
SIZE STONE: NORMAL MM
SPEC SOURCE SUBJ: NORMAL
STONE ANALYSIS-IMP: NORMAL
STONE ANALYSIS-IMP: NORMAL
WT STONE: 27 MG

## 2022-05-17 ENCOUNTER — HOSPITAL ENCOUNTER (OUTPATIENT)
Dept: ULTRASOUND IMAGING | Facility: MEDICAL CENTER | Age: 57
Discharge: HOME | End: 2022-05-17
Payer: COMMERCIAL

## 2022-05-17 DIAGNOSIS — N20.1 RIGHT URETERAL STONE: ICD-10-CM

## 2022-05-17 PROCEDURE — 76770 US EXAM ABDO BACK WALL COMP: CPT

## 2022-05-24 ENCOUNTER — OFFICE VISIT (OUTPATIENT)
Dept: UROLOGY | Facility: CLINIC | Age: 57
End: 2022-05-24
Payer: COMMERCIAL

## 2022-05-24 VITALS
BODY MASS INDEX: 24.52 KG/M2 | WEIGHT: 185 LBS | HEIGHT: 73 IN | SYSTOLIC BLOOD PRESSURE: 140 MMHG | DIASTOLIC BLOOD PRESSURE: 80 MMHG | HEART RATE: 103 BPM

## 2022-05-24 DIAGNOSIS — Z12.11 ENCOUNTER FOR SCREENING COLONOSCOPY: ICD-10-CM

## 2022-05-24 DIAGNOSIS — N20.0 CALCULUS OF KIDNEY: Primary | ICD-10-CM

## 2022-05-24 DIAGNOSIS — Z12.5 SCREENING FOR PROSTATE CANCER: ICD-10-CM

## 2022-05-24 DIAGNOSIS — N52.9 ERECTILE DYSFUNCTION, UNSPECIFIED ERECTILE DYSFUNCTION TYPE: ICD-10-CM

## 2022-05-24 PROBLEM — N20.1 RIGHT URETERAL STONE: Status: RESOLVED | Noted: 2021-11-12 | Resolved: 2022-05-24

## 2022-05-24 PROCEDURE — 99214 OFFICE O/P EST MOD 30 MIN: CPT | Performed by: PHYSICIAN ASSISTANT

## 2022-05-24 RX ORDER — SILDENAFIL 50 MG/1
TABLET, FILM COATED ORAL
Qty: 30 TABLET | Refills: 1 | Status: SHIPPED | OUTPATIENT
Start: 2022-05-24

## 2022-05-24 NOTE — PROGRESS NOTES
5/24/2022      No chief complaint on file  Assessment and Plan    49 y o  male managed by Dr Lucy Jean    1  Right ureteral stone  - status post URS/laser/stent February 2022  - well-healed, no significant residual stone burden    2  Erectile dysfunction  - begin sildenafil 50 mg p r n , dosing and safety reviewed today    3  Colorectal cancer screening  - referral colonoscopy screening    4  Prostate cancer screening  - begin this year with PSA/digital rectal examination at next visit age 56    Plan: Followup 1 year/annual for psa/garcía and renal US    History of Present Illness  Santiago Adams is a 49 y o  male here for evaluation of three-month postop follow-up after right ureteroscopy with laser lithotripsy  Stent removed on a string several days later  Renal colic began in October 2021 he felt better but never passed a stone  Repeat imaging for recurrent pain back in February revealed persistent ureteral stone  Surgery was uncomplicated  Presents today with updated ultrasound no significant stone burden, resolved hydronephrosis  Small 2 mm fragment post lithotripsy in the right lower pole  He has no voiding complaints  Reports decreased erectile rigidity less than 50% over the last 5-10 years  He has not use medication for erectile dysfunction in the past   He is unable to have penetrative intercourse for several years  He is interested in treatment option  Review of Systems   Constitutional: Negative for activity change, appetite change, chills, fever and unexpected weight change  HENT: Negative  Respiratory: Negative  Negative for shortness of breath  Cardiovascular: Negative  Negative for chest pain  Gastrointestinal: Negative for abdominal pain, diarrhea, nausea and vomiting  Endocrine: Negative  Genitourinary: Negative for decreased urine volume, difficulty urinating, dysuria, flank pain, frequency, hematuria, testicular pain and urgency     Musculoskeletal: Negative for back pain and gait problem  Skin: Negative  Allergic/Immunologic: Negative  Neurological: Negative  Hematological: Negative for adenopathy  Does not bruise/bleed easily  Vitals  Vitals:    05/24/22 1455   BP: 140/80   Pulse: 103   Weight: 83 9 kg (185 lb)   Height: 6' 1" (1 854 m)       Physical Exam  Vitals and nursing note reviewed  Constitutional:       General: He is not in acute distress  Appearance: Normal appearance  He is well-developed  He is not diaphoretic  HENT:      Head: Normocephalic and atraumatic  Pulmonary:      Effort: Pulmonary effort is normal       Comments: No cough or audible wheeze  Abdominal:      General: There is no distension  Tenderness: There is no abdominal tenderness  There is no right CVA tenderness or left CVA tenderness  Musculoskeletal:      Right lower leg: No edema  Left lower leg: No edema  Skin:     General: Skin is warm and dry  Neurological:      Mental Status: He is alert and oriented to person, place, and time        Gait: Gait normal    Psychiatric:         Speech: Speech normal          Behavior: Behavior normal            Past History  Past Medical History:   Diagnosis Date    Chronic back pain     COVID 09/2021    Essential hypertension     Kidney stone     Lumbar herniated disc     Pneumonia     as child    Sciatica     Seasonal allergies      Social History     Socioeconomic History    Marital status: /Civil Union     Spouse name: None    Number of children: None    Years of education: None    Highest education level: None   Occupational History    None   Tobacco Use    Smoking status: Never Smoker    Smokeless tobacco: Never Used   Vaping Use    Vaping Use: Never used   Substance and Sexual Activity    Alcohol use: No    Drug use: No    Sexual activity: None   Other Topics Concern    None   Social History Narrative    None     Social Determinants of Health     Financial Resource Strain: Not on file   Food Insecurity: Not on file   Transportation Needs: Not on file   Physical Activity: Not on file   Stress: Not on file   Social Connections: Not on file   Intimate Partner Violence: Not on file   Housing Stability: Not on file     Social History     Tobacco Use   Smoking Status Never Smoker   Smokeless Tobacco Never Used     Family History   Problem Relation Age of Onset    Heart attack Mother     Kidney disease Father     Heart failure Father     Deep vein thrombosis Father     Urolithiasis Father        The following portions of the patient's history were reviewed and updated as appropriate: allergies, current medications, past medical history, past social history, past surgical history and problem list     Results  No results found for this or any previous visit (from the past 1 hour(s))  ]  No results found for: PSA  Lab Results   Component Value Date    CALCIUM 8 6 10/27/2021    K 4 1 10/27/2021    CO2 26 10/27/2021     10/27/2021    BUN 13 10/27/2021    CREATININE 0 98 10/27/2021     Lab Results   Component Value Date    WBC 10 55 (H) 10/27/2021    HGB 14 2 10/27/2021    HCT 41 3 10/27/2021    MCV 86 10/27/2021     10/27/2021

## 2022-06-06 ENCOUNTER — TELEPHONE (OUTPATIENT)
Dept: OTHER | Facility: OTHER | Age: 57
End: 2022-06-06

## 2023-05-18 ENCOUNTER — TELEPHONE (OUTPATIENT)
Dept: FAMILY MEDICINE CLINIC | Facility: CLINIC | Age: 58
End: 2023-05-18

## 2023-06-01 NOTE — TELEPHONE ENCOUNTER
06/01/23 9:57 AM    The office’s request has been received. If a patient has not been seen in within last 3 years, 3 phone calls and a certified letter (must be letter listed in workflow) are to be sent. Once that workflow is completed, please resend PCP removal request with date letter was sent. PCP will be removed 30 days after certified letter sent (if patient doesn’t respond/scheduled with office).           Thank you  Angelito Girard

## 2023-12-15 ENCOUNTER — APPOINTMENT (INPATIENT)
Dept: MRI IMAGING | Facility: HOSPITAL | Age: 58
End: 2023-12-15
Payer: COMMERCIAL

## 2023-12-15 ENCOUNTER — HOSPITAL ENCOUNTER (INPATIENT)
Facility: HOSPITAL | Age: 58
LOS: 2 days | Discharge: HOME | End: 2023-12-17
Attending: EMERGENCY MEDICINE | Admitting: INTERNAL MEDICINE
Payer: COMMERCIAL

## 2023-12-15 ENCOUNTER — APPOINTMENT (EMERGENCY)
Dept: CT IMAGING | Facility: HOSPITAL | Age: 58
End: 2023-12-15
Payer: COMMERCIAL

## 2023-12-15 DIAGNOSIS — I63.9 ACUTE CVA (CEREBROVASCULAR ACCIDENT) (HCC): ICD-10-CM

## 2023-12-15 DIAGNOSIS — R29.90 STROKE-LIKE SYMPTOMS: Primary | ICD-10-CM

## 2023-12-15 LAB
ALBUMIN SERPL BCP-MCNC: 4.4 G/DL (ref 3.5–5)
ALP SERPL-CCNC: 142 U/L (ref 34–104)
ALT SERPL W P-5'-P-CCNC: 16 U/L (ref 7–52)
ANION GAP SERPL CALCULATED.3IONS-SCNC: 7 MMOL/L
APTT PPP: 29 SECONDS (ref 23–37)
AST SERPL W P-5'-P-CCNC: 16 U/L (ref 13–39)
ATRIAL RATE: 107 BPM
ATRIAL RATE: 94 BPM
BASOPHILS # BLD AUTO: 0.09 THOUSANDS/ÂΜL (ref 0–0.1)
BASOPHILS NFR BLD AUTO: 1 % (ref 0–1)
BILIRUB SERPL-MCNC: 0.49 MG/DL (ref 0.2–1)
BUN SERPL-MCNC: 17 MG/DL (ref 5–25)
CALCIUM SERPL-MCNC: 9.9 MG/DL (ref 8.4–10.2)
CARDIAC TROPONIN I PNL SERPL HS: <2 NG/L
CARDIAC TROPONIN I PNL SERPL HS: <2 NG/L
CHLORIDE SERPL-SCNC: 101 MMOL/L (ref 96–108)
CO2 SERPL-SCNC: 29 MMOL/L (ref 21–32)
CREAT SERPL-MCNC: 1.01 MG/DL (ref 0.6–1.3)
EOSINOPHIL # BLD AUTO: 0.59 THOUSAND/ÂΜL (ref 0–0.61)
EOSINOPHIL NFR BLD AUTO: 6 % (ref 0–6)
ERYTHROCYTE [DISTWIDTH] IN BLOOD BY AUTOMATED COUNT: 12.7 % (ref 11.6–15.1)
GFR SERPL CREATININE-BSD FRML MDRD: 82 ML/MIN/1.73SQ M
GLUCOSE SERPL-MCNC: 93 MG/DL (ref 65–140)
GLUCOSE SERPL-MCNC: 97 MG/DL (ref 65–140)
HCT VFR BLD AUTO: 47.4 % (ref 36.5–49.3)
HGB BLD-MCNC: 15.7 G/DL (ref 12–17)
IMM GRANULOCYTES # BLD AUTO: 0.03 THOUSAND/UL (ref 0–0.2)
IMM GRANULOCYTES NFR BLD AUTO: 0 % (ref 0–2)
INR PPP: 0.94 (ref 0.84–1.19)
LYMPHOCYTES # BLD AUTO: 2.01 THOUSANDS/ÂΜL (ref 0.6–4.47)
LYMPHOCYTES NFR BLD AUTO: 20 % (ref 14–44)
MCH RBC QN AUTO: 28.1 PG (ref 26.8–34.3)
MCHC RBC AUTO-ENTMCNC: 33.1 G/DL (ref 31.4–37.4)
MCV RBC AUTO: 85 FL (ref 82–98)
MONOCYTES # BLD AUTO: 0.84 THOUSAND/ÂΜL (ref 0.17–1.22)
MONOCYTES NFR BLD AUTO: 8 % (ref 4–12)
NEUTROPHILS # BLD AUTO: 6.67 THOUSANDS/ÂΜL (ref 1.85–7.62)
NEUTS SEG NFR BLD AUTO: 65 % (ref 43–75)
NRBC BLD AUTO-RTO: 0 /100 WBCS
P AXIS: 70 DEGREES
P AXIS: 76 DEGREES
PLATELET # BLD AUTO: 339 THOUSANDS/UL (ref 149–390)
PMV BLD AUTO: 11.1 FL (ref 8.9–12.7)
POTASSIUM SERPL-SCNC: 3.8 MMOL/L (ref 3.5–5.3)
PR INTERVAL: 146 MS
PR INTERVAL: 148 MS
PROT SERPL-MCNC: 7.7 G/DL (ref 6.4–8.4)
PROTHROMBIN TIME: 12.8 SECONDS (ref 11.6–14.5)
QRS AXIS: 62 DEGREES
QRS AXIS: 68 DEGREES
QRSD INTERVAL: 76 MS
QRSD INTERVAL: 80 MS
QT INTERVAL: 338 MS
QT INTERVAL: 346 MS
QTC INTERVAL: 432 MS
QTC INTERVAL: 451 MS
RBC # BLD AUTO: 5.59 MILLION/UL (ref 3.88–5.62)
SODIUM SERPL-SCNC: 137 MMOL/L (ref 135–147)
T WAVE AXIS: 55 DEGREES
T WAVE AXIS: 60 DEGREES
VENTRICULAR RATE: 107 BPM
VENTRICULAR RATE: 94 BPM
WBC # BLD AUTO: 10.23 THOUSAND/UL (ref 4.31–10.16)

## 2023-12-15 PROCEDURE — 80053 COMPREHEN METABOLIC PANEL: CPT | Performed by: EMERGENCY MEDICINE

## 2023-12-15 PROCEDURE — 82948 REAGENT STRIP/BLOOD GLUCOSE: CPT

## 2023-12-15 PROCEDURE — 84484 ASSAY OF TROPONIN QUANT: CPT | Performed by: EMERGENCY MEDICINE

## 2023-12-15 PROCEDURE — 99291 CRITICAL CARE FIRST HOUR: CPT | Performed by: PHYSICIAN ASSISTANT

## 2023-12-15 PROCEDURE — 85025 COMPLETE CBC W/AUTO DIFF WBC: CPT | Performed by: EMERGENCY MEDICINE

## 2023-12-15 PROCEDURE — 70496 CT ANGIOGRAPHY HEAD: CPT

## 2023-12-15 PROCEDURE — 85730 THROMBOPLASTIN TIME PARTIAL: CPT | Performed by: EMERGENCY MEDICINE

## 2023-12-15 PROCEDURE — 70551 MRI BRAIN STEM W/O DYE: CPT

## 2023-12-15 PROCEDURE — 99285 EMERGENCY DEPT VISIT HI MDM: CPT | Performed by: EMERGENCY MEDICINE

## 2023-12-15 PROCEDURE — 93005 ELECTROCARDIOGRAM TRACING: CPT

## 2023-12-15 PROCEDURE — 99285 EMERGENCY DEPT VISIT HI MDM: CPT

## 2023-12-15 PROCEDURE — 85610 PROTHROMBIN TIME: CPT | Performed by: EMERGENCY MEDICINE

## 2023-12-15 PROCEDURE — 36415 COLL VENOUS BLD VENIPUNCTURE: CPT | Performed by: EMERGENCY MEDICINE

## 2023-12-15 PROCEDURE — 70498 CT ANGIOGRAPHY NECK: CPT

## 2023-12-15 PROCEDURE — 99222 1ST HOSP IP/OBS MODERATE 55: CPT | Performed by: INTERNAL MEDICINE

## 2023-12-15 RX ORDER — CLOPIDOGREL BISULFATE 75 MG/1
300 TABLET ORAL ONCE
Status: COMPLETED | OUTPATIENT
Start: 2023-12-15 | End: 2023-12-15

## 2023-12-15 RX ORDER — ASPIRIN 325 MG
325 TABLET ORAL ONCE
Status: COMPLETED | OUTPATIENT
Start: 2023-12-15 | End: 2023-12-15

## 2023-12-15 RX ORDER — CLOPIDOGREL BISULFATE 75 MG/1
75 TABLET ORAL DAILY
Status: DISCONTINUED | OUTPATIENT
Start: 2023-12-16 | End: 2023-12-17 | Stop reason: HOSPADM

## 2023-12-15 RX ORDER — ATORVASTATIN CALCIUM 40 MG/1
40 TABLET, FILM COATED ORAL EVERY EVENING
Status: DISCONTINUED | OUTPATIENT
Start: 2023-12-15 | End: 2023-12-17 | Stop reason: HOSPADM

## 2023-12-15 RX ORDER — ENOXAPARIN SODIUM 100 MG/ML
40 INJECTION SUBCUTANEOUS DAILY
Status: DISCONTINUED | OUTPATIENT
Start: 2023-12-15 | End: 2023-12-17 | Stop reason: HOSPADM

## 2023-12-15 RX ADMIN — ATORVASTATIN CALCIUM 40 MG: 40 TABLET, FILM COATED ORAL at 17:56

## 2023-12-15 RX ADMIN — ASPIRIN 325 MG ORAL TABLET 325 MG: 325 PILL ORAL at 10:02

## 2023-12-15 RX ADMIN — ENOXAPARIN SODIUM 40 MG: 40 INJECTION SUBCUTANEOUS at 12:31

## 2023-12-15 RX ADMIN — CLOPIDOGREL BISULFATE 300 MG: 75 TABLET ORAL at 10:02

## 2023-12-15 RX ADMIN — IOHEXOL 85 ML: 350 INJECTION, SOLUTION INTRAVENOUS at 09:24

## 2023-12-15 NOTE — ASSESSMENT & PLAN NOTE
"58-year-old male with no pertinent past medical history nor daily medications, presenting as prehospital stroke alert on 12/15 after awakening with severe left-sided neck pain as well as right-sided atypical sensation to the extremities (felt \"asleep\").  While at work, coworkers also noted slight dysarthria.  Slight right upper extremity drift per EMS.    Of note, patient notes similar episode of R sided abnormal sensation happened approximately 2 to 3 weeks ago (lasting several hours before resolving).    Hypertensive in route per EMS (200s/100s) NIHSS of 1 during alert. No acute findings on neuroimaging.    MRI brain yesterday confirmed L thalamic infarction, appears small vessel in etiology given location  12/16: R sided symptoms have resolved, non-focal neuro exam.    Neuroimaging during alert:  -CT head negative for acute intracranial pathology  -CTA head/neck negative for LVO, no other flow restrictive disease/vascular abnormalities.   -MRI brain with L thalamic infarction    Plan:  -acute ischemic stroke pathway ongoing:  -2D echocardiogram pending  -continue DAPT (aspirin 81 mg/plavix 75 mg); likely for 3 weeks, followed by aspirin monotherapy  -continue lipitor 40 mg daily  -hemoglobin A1C pending, lipid panel with LDL of 88  -can pursue normotensive goals  -neuro checks  -telemetry monitoring  -provide stroke education  -therapy evaluations  "

## 2023-12-15 NOTE — ASSESSMENT & PLAN NOTE
Right sided paresthesia and dysarthria  Stroke versus TIA  Initial Imaging negative. Symptoms resolved  Continue stroke workup with MRI, echo, PT/OT, neurology consult  He received ASA/plavix today  Start lipitor tonight. Resume asa/plavix tomorrow

## 2023-12-15 NOTE — ED PROVIDER NOTES
History  Chief Complaint   Patient presents with    STROKE Alert     Arrives via ems pre-hosp alert from work with reported R arm drift and slurred speech. Pt c/o neck pain and light-headedness       History provided by:  Patient and EMS personnel   used: No    STROKE Alert  Quality:  Dizziness, Right arm drift, slurred speech  Severity:  Moderate  Onset quality:  Unable to specify  Duration:  5 hours  Timing:  Intermittent  Progression:  Waxing and waning  Chronicity:  New  Context:  Woke up around 4:30 am, c/o posterior neck pain, went to work, noted around 8:30 am to be dizzy, slurred speech, right arm drift per EMS, /100, pineda snot take any meds, seen as Pre-Hospital Stroke Alert  Relieved by:  Nothing  Worsened by:  Nothing  Ineffective treatments:  None  Associated symptoms: no abdominal pain, no chest pain, no cough, no diarrhea, no fever, no headaches, no loss of consciousness, no nausea, no rash, no shortness of breath, no sore throat and no vomiting    Risk factors:  HTN      Prior to Admission Medications   Prescriptions Last Dose Informant Patient Reported? Taking?   sildenafil (VIAGRA) 50 MG tablet   No No   Sig: Take 1 tablet by mouth on empty stomach one hour prior to sexual activity      Facility-Administered Medications: None       Past Medical History:   Diagnosis Date    Chronic back pain     COVID 09/2021    Essential hypertension     Kidney stone     Lumbar herniated disc     Pneumonia     as child    Sciatica     Seasonal allergies        Past Surgical History:   Procedure Laterality Date    BACK SURGERY      FL RETROGRADE PYELOGRAM  2/17/2022    HAND SURGERY      NASAL SEPTUM SURGERY      HI CYSTO/URETERO W/LITHOTRIPSY &INDWELL STENT INSRT Right 2/17/2022    Procedure: CYSTO URETEROSCOPY W/ LITHO HOLMIUM LASER, RETROGRADE PYELOGRAM, RIGHT URETERAL STENT PLACEMENT;  Surgeon: Valentin Serrano MD;  Location: [facility];  Service: Urology    HI LAMNOTMY INCL W/DCMPRSN  NRV ROOT 1 INTRSPC LUMBR Left 11/7/2016    Procedure: L5-S1 MINIMALLY INVASIVE MICROSCOPIC DISCECTOMY AND POSSIBLE EPIDURAL STEROID INJECTION ;  Surgeon: Bobby Alvarez MD;  Location: [facility];  Service: Neurosurgery    WRIST SURGERY         Family History   Problem Relation Age of Onset    Heart attack Mother     Kidney disease Father     Heart failure Father     Deep vein thrombosis Father     Urolithiasis Father      I have reviewed and agree with the history as documented.    E-Cigarette/Vaping    E-Cigarette Use Never User      E-Cigarette/Vaping Substances    Nicotine No     THC No     CBD No     Flavoring No     Other No     Unknown No      Social History     Tobacco Use    Smoking status: Never    Smokeless tobacco: Never   Vaping Use    Vaping status: Never Used   Substance Use Topics    Alcohol use: No    Drug use: No       Review of Systems   Constitutional:  Negative for chills and fever.   HENT:  Negative for facial swelling, sore throat and trouble swallowing.    Eyes:  Negative for pain and visual disturbance.   Respiratory:  Negative for cough, chest tightness and shortness of breath.    Cardiovascular:  Negative for chest pain and leg swelling.   Gastrointestinal:  Negative for abdominal pain, diarrhea, nausea and vomiting.   Genitourinary:  Negative for dysuria and flank pain.   Musculoskeletal:  Negative for back pain, neck pain and neck stiffness.   Skin:  Negative for pallor and rash.   Allergic/Immunologic: Negative for environmental allergies and immunocompromised state.   Neurological:  Positive for dizziness and speech difficulty (resolved). Negative for loss of consciousness and headaches.   Hematological:  Negative for adenopathy. Does not bruise/bleed easily.   Psychiatric/Behavioral:  Negative for agitation and behavioral problems.    All other systems reviewed and are negative.      Physical Exam  Physical Exam  Vitals and nursing note reviewed.   Constitutional:       General: He is  not in acute distress.     Appearance: He is well-developed.   HENT:      Head: Normocephalic and atraumatic.   Eyes:      Extraocular Movements: Extraocular movements intact.   Cardiovascular:      Rate and Rhythm: Normal rate and regular rhythm.      Heart sounds: Normal heart sounds.   Pulmonary:      Effort: Pulmonary effort is normal. No respiratory distress.      Breath sounds: Normal breath sounds.   Abdominal:      Palpations: Abdomen is soft.      Tenderness: There is no abdominal tenderness. There is no guarding or rebound.   Musculoskeletal:         General: Normal range of motion.      Cervical back: Normal range of motion and neck supple.   Skin:     General: Skin is warm and dry.   Neurological:      Mental Status: He is alert and oriented to person, place, and time.      Cranial Nerves: No cranial nerve deficit.      Sensory: Sensory deficit present.      Comments: Conscious, alert, no facial droop, no slurred speech, no arm or leg weakness (mild bobbing of right arm but no drift), diminished sensation right arm, normal coordination, NIH 1   Psychiatric:         Mood and Affect: Mood normal.         Behavior: Behavior normal.         Vital Signs  ED Triage Vitals   Temperature Pulse Respirations Blood Pressure SpO2   12/15/23 0940 12/15/23 0935 12/15/23 0935 12/15/23 0935 12/15/23 0935   98.2 °F (36.8 °C) 103 18 162/95 97 %      Temp Source Heart Rate Source Patient Position - Orthostatic VS BP Location FiO2 (%)   12/15/23 0940 12/15/23 0935 12/15/23 0935 12/15/23 0935 --   Oral Monitor Lying Right arm       Pain Score       12/15/23 0935       No Pain           Vitals:    12/15/23 1518 12/15/23 1630 12/15/23 1730 12/15/23 2130   BP: 138/80 140/80 128/73 144/96   Pulse: 90 98 100 92   Patient Position - Orthostatic VS: Lying Lying Lying Lying         Visual Acuity  Visual Acuity      Flowsheet Row Most Recent Value   L Pupil Size (mm) 3   R Pupil Size (mm) 3   L Pupil Shape Round   R Pupil Shape Round             ED Medications  Medications   atorvastatin (LIPITOR) tablet 40 mg (40 mg Oral Given 12/15/23 1756)   clopidogrel (PLAVIX) tablet 75 mg (has no administration in time range)   enoxaparin (LOVENOX) subcutaneous injection 40 mg (40 mg Subcutaneous Given 12/15/23 1231)   aspirin (ECOTRIN LOW STRENGTH) EC tablet 81 mg (has no administration in time range)   iohexol (OMNIPAQUE) 350 MG/ML injection (SINGLE-DOSE) 85 mL (85 mL Intravenous Given 12/15/23 0924)   aspirin tablet 325 mg (325 mg Oral Given 12/15/23 1002)   clopidogrel (PLAVIX) tablet 300 mg (300 mg Oral Given 12/15/23 1002)       Diagnostic Studies  Results Reviewed       Procedure Component Value Units Date/Time    HS Troponin I 2hr [[id number]] Collected: 12/15/23 1130    Lab Status: Final result Specimen: Blood from Arm, Left Updated: 12/15/23 1200     hs TnI 2hr <2 ng/L      Delta 2hr hsTnI --    Platelet count [[id number]]     Lab Status: No result Specimen: Blood     HS Troponin 0hr (reflex protocol) [[id number]]  (Normal) Collected: 12/15/23 0923    Lab Status: Final result Specimen: Blood from Line Updated: 12/15/23 0949     hs TnI 0hr <2 ng/L     Comprehensive metabolic panel [[id number]]  (Abnormal) Collected: 12/15/23 0923    Lab Status: Final result Specimen: Blood from Line Updated: 12/15/23 0942     Sodium 137 mmol/L      Potassium 3.8 mmol/L      Chloride 101 mmol/L      CO2 29 mmol/L      ANION GAP 7 mmol/L      BUN 17 mg/dL      Creatinine 1.01 mg/dL      Glucose 97 mg/dL      Calcium 9.9 mg/dL      AST 16 U/L      ALT 16 U/L      Alkaline Phosphatase 142 U/L      Total Protein 7.7 g/dL      Albumin 4.4 g/dL      Total Bilirubin 0.49 mg/dL      eGFR 82 ml/min/1.73sq m     Narrative:      National Kid[employer] Foundation guidelines for Chronic Kidney Disease (CKD):     Stage 1 with normal or high GFR (GFR > 90 mL/min/1.73 square meters)    Stage 2 Mild CKD (GFR = 60-89 mL/min/1.73 square meters)    Stage 3A Moderate CKD (GFR = 45-59  mL/min/1.73 square meters)    Stage 3B Moderate CKD (GFR = 30-44 mL/min/1.73 square meters)    Stage 4 Severe CKD (GFR = 15-29 mL/min/1.73 square meters)    Stage 5 End Stage CKD (GFR <15 mL/min/1.73 square meters)  Note: GFR calculation is accurate only with a steady state creatinine    Protime-INR [[id number]]  (Normal) Collected: 12/15/23 0923    Lab Status: Final result Specimen: Blood from Line Updated: 12/15/23 0938     Protime 12.8 seconds      INR 0.94    APTT [[id number]]  (Normal) Collected: 12/15/23 0923    Lab Status: Final result Specimen: Blood from Line Updated: 12/15/23 0938     PTT 29 seconds     Fingerstick Glucose (POCT) [[id number]]  (Normal) Collected: 12/15/23 0933    Lab Status: Final result Updated: 12/15/23 0937     POC Glucose 93 mg/dl     CBC and differential [[id number]]  (Abnormal) Collected: 12/15/23 0923    Lab Status: Final result Specimen: Blood from Line Updated: 12/15/23 0928     WBC 10.23 Thousand/uL      RBC 5.59 Million/uL      Hemoglobin 15.7 g/dL      Hematocrit 47.4 %      MCV 85 fL      MCH 28.1 pg      MCHC 33.1 g/dL      RDW 12.7 %      MPV 11.1 fL      Platelets 339 Thousands/uL      nRBC 0 /100 WBCs      Neutrophils Relative 65 %      Immat GRANS % 0 %      Lymphocytes Relative 20 %      Monocytes Relative 8 %      Eosinophils Relative 6 %      Basophils Relative 1 %      Neutrophils Absolute 6.67 Thousands/µL      Immature Grans Absolute 0.03 Thousand/uL      Lymphocytes Absolute 2.01 Thousands/µL      Monocytes Absolute 0.84 Thousand/µL      Eosinophils Absolute 0.59 Thousand/µL      Basophils Absolute 0.09 Thousands/µL                    MRI brain wo contrast   Final Result by Sandra Valencia MD (12/15 1546)   Addendum (preliminary) 1 of 1 by Sandra Valencia MD (12/15 1546)   ADDENDUM:      Upon further review of the images there is mild restricted diffusion    involving left posterior internal capsule and left lateral thalamus with    FLAIR hyperintensity consistent with acute  infarct.               Findings discussed via [place] text with Dr. Jaspal Williamson at 3:40 p.m.      Final      1.  No acute ischemia. No significant white matter change.   2.  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 There is no intrinsic susceptibility signal or T1 hyperintensity to suggest mineralization or blood products in a complex pineal cyst. Recommend further characterization with    contrast-enhanced brain MRI.            This study was marked in EPIC for notification and follow-up.               Workstation performed: [id number]         CTA stroke alert (head/neck)   Final Result by Kel Canales DO (12/15 0941)      No high-grade stenosis or occlusive disease of the cervical or intracranial vasculature. Minor atherosclerotic change of both carotid bifurcations.      Findings were directly discussed with Jaspal Jha at 9:37 a.m.                           Workstation performed: [id number]         CT stroke alert brain   Final Result by Kel Canales DO (12/15 0940)      No acute intracranial abnormality.      Findings were directly discussed with Jaspal Jha at 9:37 a.m.      Workstation performed: [id number]                    Procedures  ECG 12 Lead Documentation Only    Date/Time: 12/15/2023 10:00 AM    Performed by: Ochoa Rincon MD  Authorized by: Ochoa Rincon MD    Indications / Diagnosis:  Strokelike symptoms  ECG reviewed by me, the ED Provider: yes    Patient location:  ED  Interpretation:     Interpretation: normal    Rate:     ECG rate assessment: normal    Rhythm:     Rhythm: sinus rhythm    Ectopy:     Ectopy: none    QRS:     QRS axis:  Normal  Conduction:     Conduction: normal    ST segments:     ST segments:  Normal  T waves:     T waves: normal             ED Course  ED Course as of 12/15/23 2305   Fri Dec 15, 2023   0945 WBC(!): 10.23   0946 Hemoglobin: 15.7   0946 Platelet Count: 339   0946 Sodium: 137   0946 Potassium:  3.8   0946 BUN: 17   0946 Creatinine: 1.01   0946 Glucose, Random: 97  Labs reviewed, non-acute.   0947 CT stroke alert brain  IMPRESSION:     No acute intracranial abnormality.     0947 CTA stroke alert (head/neck)  IMPRESSION:     No high-grade stenosis or occlusive disease of the cervical or intracranial vasculature. Minor atherosclerotic change of both carotid bifurcations.     0955 Patient seen by Neurology in ER, NIH 1 for right  arm numbness, about 5 hours from onset, also had similar symptoms couple of weeks back; no criteria for TNK; will give Asa and Plavix. Admit on Stroke Pathway.                  Stroke Assessment       Row Name 12/15/23 0958             NIH Stroke Scale    Interval Baseline      Level of Consciousness (1a.) 0      LOC Questions (1b.) 0      LOC Commands (1c.) 0      Best Gaze (2.) 0      Visual (3.) 0      Facial Palsy (4.) 0      Motor Arm, Left (5a.) 0      Motor Arm, Right (5b.) 0      Motor Leg, Left (6a.) 0      Motor Leg, Right (6b.) 0      Limb Ataxia (7.) 0      Sensory (8.) 1      Best Language (9.) 0      Dysarthria (10.) 0      Extinction and Inattention (11.) (Formerly Neglect) 0      Total 1                                SBIRT 22yo+      Flowsheet Row Most Recent Value   Initial Alcohol Screen: US AUDIT-C     1. How often do you have a drink containing alcohol? 0 Filed at: 12/15/2023 0937   2. How many drinks containing alcohol do you have on a typical day you are drinking?  0 Filed at: 12/15/2023 0937   3a. Male UNDER 65: How often do you have five or more drinks on one occasion? 0 Filed at: 12/15/2023 0937   Audit-C Score 0 Filed at: 12/15/2023 0937   ANA: How many times in the past year have you...    Used an illegal drug or used a prescription medication for non-medical reasons? Never Filed at: 12/15/2023 0937                      Medical Decision Making  Patient is a 58-year-old male, history of hypertension, woke up today around 4:30 AM with posterior neck pain,  right arm numbness, went to work, was noted to be dizzy around 8:30 AM at work, slurred speech, right pronator drift per EMS, seen as prehospital stroke alert, denies headache, chest pain, dyspnea, abdominal pain, back pain, fever, recent illness.  Had similar symptoms about couple weeks back which lasted for couple of hours and resolved. On exam, patient is conscious, alert, vital signs stable, no acute distress, right arm numbness, NIH 1, no facial droop, no slurred speech, no drift at this time.  Impression: Stroke  like symptoms, seen as Pre-hospital Stroke alert, will get CT CTA head and neck, labs, EKG, admit for further evaluation.    Problems Addressed:  Stroke-like symptoms: acute illness or injury    Amount and/or Complexity of Data Reviewed  Labs: ordered. Decision-making details documented in ED Course.  Radiology: ordered and independent interpretation performed. Decision-making details documented in ED Course.     Details: CT brain w/o bleeding  ECG/medicine tests: ordered and independent interpretation performed. Decision-making details documented in ED Course.    Risk  Prescription drug management.  Decision regarding hospitalization.             Disposition  Final diagnoses:   Stroke-like symptoms     Time reflects when diagnosis was documented in both MDM as applicable and the Disposition within this note       Time User Action Codes Description Comment    12/15/2023  9:29 AM Ochoa Rincon Add [R29.90] Stroke-like symptoms           ED Disposition       ED Disposition   Admit    Condition   Stable    Date/Time   Fri Dec 15, 2023 8338    Comment   Case was discussed with Dr. Pichardo and the patient's admission status was agreed to be Admission Status: inpatient status to the service of Dr. Pichardo.               Follow-up Information       Follow up With Specialties Details Why Contact Info Additional Information    [facility] Follow up Office will call patient to  arrange follow up. If you do not hear from office within 7-10 days following discharge, please call office at [phone] to inquire. [address]  [place] [address] [facility] [address] [phone] [facility] [place], [facility] [address] [place] [address] [place], [place], [address] [phone]            Current Discharge Medication List        CONTINUE these medications which have NOT CHANGED    Details   sildenafil (VIAGRA) 50 MG tablet Take 1 tablet by mouth on empty stomach one hour prior to sexual activity  Qty: 30 tablet, Refills: 1    Associated Diagnoses: Erectile dysfunction, unspecified erectile dysfunction type             No discharge procedures on file.    PDMP Review         Value Time User    PDMP Reviewed  Yes 11/12/2021 12:41 PM FILIPE Gonsales            ED Provider  Electronically Signed by             Ochoa Rincon MD  12/15/23 6797

## 2023-12-15 NOTE — CONSULTS
Consultation - Stroke   Roberto Emerson 58 y.o. male MRN: [id number]  Unit/Bed#: ED-28 Encounter: [id number]    ADDENDUM: MRI brain notes L thalamic infarct, correlating with patient's R sided sensory symptoms. Await radiology read, Appears most likely small vessel in origin. Will continue with plan as below.    Will await 2D echo, otherwise if unrevealing no further inpatient neurologic workup is anticipated. Discussed MRI imaging with patient this afternoon.     Assessment/Plan     Stroke-like symptoms  Assessment & Plan  58-year-old male with no pertinent past medical history nor daily medications, presenting as prehospital stroke alert on 12/15 after awakening this morning with severe left-sided neck pain as well as right-sided atypical sensation to the extremities.  While at work this morning coworkers also noted slight dysarthria.  Slight right upper extremity drift per EMS.    Of note, patient notes exact same episode of R sided abnormal sensation happened approximately 2 to 3 weeks ago (lasting several hours before resolving)    Hypertensive in route per EMS (200s/100s) NIHSS of 1 during alert.     No acute findings on neuroimaging; differential includes small lacunar CVA, hypertensive encephalopathy.     Thrombolytic Decision: Patient not a candidate. Unclear time of onset outside appropriate time window. and Symptoms resolved/clearly non disabling.    Neuroimaging during alert:  -CT head negative for acute intracranial pathology  -CTA head/neck negative for LVO, no other flow restrictive disease/vascular abnormalities.     Plan:  -admit under acute ischemic stroke pathway:  -obtain MRI brain  -obtain 2D echocardiogram  -load with DAPT today (aspirin 325, plavix 300 x1)  -continue DAPT tomorrow (aspirin 81 mg/plavix 75 mg); anticipate 21 days of DAPT, then monotherapy with aspirin  -initiate lipitor 40 mg daily  -check hemoglobin A1C, lipid panel  -permissive hypertension for next 24-48 hours  -neuro  "checks  -telemetry monitoring  -provide stroke education  -therapy evaluations    Discussed plan of care with attending neurologist throughout duration of stroke alert.     Roberto Emerson will need follow up in in 6 weeks with neurovascular attending or advance practitioner. He will not require outpatient neurological testing.    History of Present Illness     Reason for Consult / Principal Problem: Stroke alert, right-sided sensory loss, dysarthria    Patient last known well: Last night 12/14 awoke with symptoms at 4:30 AM   Stroke alert called: Prehospital at 9:09 AM  Neurology time of arrival: 9:14 AM    HPI: Roberto Emerson is a 58 y.o. male  with history as mentioned above in assessment who neurology is asked to evaluate in regards to the above.    History obtained via discussion with EMS as well as patient this morning.    Patient works for [employer]; he went to bed normal last night, without symptoms.  He woke up this morning around 4:30 AM and noticed severe left-sided neck pain (there was no provoking injury/trauma the past few days prior to this).    As he stood up out of bed after waking up, he noticed his right side felt \"asleep\" and had some gait instability as a result.  He was still able to ambulate around his home into his car and drive to work normally.  His right-sided symptoms persisted while at work and coworkers also appreciated slight dysarthria, thus EMS was called to his work.    Per EMS patient was with slight right pronator drift during testing, prehospital stroke alert was activated.  Was notably hypertensive with BPs consistently 200s/100s in route.    See NIHSS below and neuroimaging as mentioned above.  Was deemed not a candidate for any acute intervention.    In discussing with patient, he interestingly noted the same exact symptoms of right-sided sensory impairment happened approximately 2 to 3 weeks ago; when queried those symptoms lasted approximately several hours before " resolving.  He did not seek medical attention at that time.    Denies pertinent medical history, no daily medications, not a current/former smoker.  No family history of stroke.    Review of Systems   Unable to perform ROS: Acuity of condition       Historical Information   Past Medical History:   Diagnosis Date    Chronic back pain     COVID 09/2021    Essential hypertension     Kidney stone     Lumbar herniated disc     Pneumonia     as child    Sciatica     Seasonal allergies      Past Surgical History:   Procedure Laterality Date    BACK SURGERY      FL RETROGRADE PYELOGRAM  2/17/2022    HAND SURGERY      NASAL SEPTUM SURGERY      IA CYSTO/URETERO W/LITHOTRIPSY &INDWELL STENT INSRT Right 2/17/2022    Procedure: CYSTO URETEROSCOPY W/ LITHO HOLMIUM LASER, RETROGRADE PYELOGRAM, RIGHT URETERAL STENT PLACEMENT;  Surgeon: Valentin Serrano MD;  Location[facility];  Service: Urology    IA LAMNOTMY INCL W/DCMPRSN NRV ROOT 1 INTRSPC LUMBR Left 11/7/2016    Procedure: L5-S1 MINIMALLY INVASIVE MICROSCOPIC DISCECTOMY AND POSSIBLE EPIDURAL STEROID INJECTION ;  Surgeon: Bobby Alvarez MD;  [facility];  Service: Neurosurgery    WRIST SURGERY       Social History   Social History     Substance and Sexual Activity   Alcohol Use No     Social History     Substance and Sexual Activity   Drug Use No     E-Cigarette/Vaping    E-Cigarette Use Never User      E-Cigarette/Vaping Substances    Nicotine No     THC No     CBD No     Flavoring No     Other No     Unknown No      Social History     Tobacco Use   Smoking Status Never   Smokeless Tobacco Never     Family History:   Family History   Problem Relation Age of Onset    Heart attack Mother     Kidney disease Father     Heart failure Father     Deep vein thrombosis Father     Urolithiasis Father        Review of previous medical records was completed.    Meds/Allergies   current meds:   No current facility-administered medications for this encounter.    and PTA meds:    Prior to Admission Medications   Prescriptions Last Dose Informant Patient Reported? Taking?   sildenafil (VIAGRA) 50 MG tablet   No No   Sig: Take 1 tablet by mouth on empty stomach one hour prior to sexual activity      Facility-Administered Medications: None       Allergies   Allergen Reactions    Shellfish-Derived Products - Food Allergy Diarrhea and Vomiting       Objective   Vitals:Blood pressure 159/94, pulse 103, temperature 98.2 °F (36.8 °C), temperature source Oral, resp. rate 17, weight 85.9 kg (189 lb 6 oz), SpO2 96%.,Body mass index is 24.99 kg/m².  No intake or output data in the 24 hours ending 12/15/23 0956    Invasive Devices:   Invasive Devices       Peripheral Intravenous Line  Duration             Peripheral IV 12/15/23 Distal;Left;Upper;Ventral (anterior) Arm <1 day                    Physical Exam  Constitutional:       Appearance: Normal appearance.   HENT:      Head: Normocephalic and atraumatic.   Eyes:      Extraocular Movements: Extraocular movements intact and EOM normal.      Conjunctiva/sclera: Conjunctivae normal.      Pupils: Pupils are equal, round, and reactive to light.   Cardiovascular:      Rate and Rhythm: Normal rate.   Pulmonary:      Effort: Pulmonary effort is normal.   Abdominal:      General: There is no distension.   Musculoskeletal:         General: Normal range of motion.      Cervical back: Normal range of motion and neck supple.   Skin:     General: Skin is warm and dry.   Neurological:      Mental Status: He is alert.       Neurologic Exam     Mental Status   Awake, alert, fully oriented, speech is clear without dysarthria nor aphasia. Following all commands.      Cranial Nerves     CN II   Visual fields full to confrontation.     CN III, IV, VI   Pupils are equal, round, and reactive to light.  Extraocular motions are normal.     CN V   Facial sensation intact.     CN VII   Facial expression full, symmetric.     CN VIII   CN VIII normal.     CN IX, X   CN IX  normal.   CN X normal.     CN XI   CN XI normal.     CN XII   CN XII normal.   Symmetric facial sensation to pinprick.      Motor Exam   Muscle bulk: normal  Overall muscle tone: normal  During initial NIHSS, with just minimal bobbing of the right upper extremity, however no clear drift.  With maximal effort, his strength in the upper and lower extremities bilaterally is full/symmetric.     Sensory Exam     He has slightly diminished pinprick sensation in the right upper and lower extremity when compared to the left (approximately 75% on the right compared to 100% on the left).  No hemineglect nor extinction.     Gait, Coordination, and Reflexes   Patient feels right finger-nose is just minimally clumsier compared to left; however no overt ataxia is observed.    Heel-to-shin fluent bilaterally.       NIHSS:  1a.Level of Consciousness: 0 = Alert   1b. LOC Questions: 0 = Answers both correctly   1c. LOC Commands: 0 = Obeys both correctly   2. Best Gaze: 0 = Normal   3. Visual: 0 = No visual field loss   4. Facial Palsy: 0=Normal symmetric movement   5a. Motor Right Arm: 0=No drift, limb holds 90 (or 45) degrees for full 10 seconds   5b. Motor Left Arm: 0=No drift, limb holds 90 (or 45) degrees for full 10 seconds   6a. Motor Right Le=No drift, limb holds 90 (or 45) degrees for full 10 seconds   6b. Motor Left Le=No drift, limb holds 90 (or 45) degrees for full 10 seconds   7. Limb Ataxia:  0=Absent   8. Sensory: 1=Mild to moderate sensory loss; patient feels pinprick is less sharp or is dull on the affected side; there is a loss of superficial pain with pinprick but patient is aware He is being touched   9. Best Language:  0=No aphasia, normal   10. Dysarthria: 0=Normal articulation   11. Extinction and Inattention (formerly Neglect): 0=No abnormality   Total Score: 1     Time NIHSS was completed: Approx. 9:25 AM    Modified Nury Score:  0 (No baseline symptoms/disability)    Lab Results: CBC:   Results from  last 7 days   Lab Units 12/15/23  0923   WBC Thousand/uL 10.23*   RBC Million/uL 5.59   HEMOGLOBIN g/dL 15.7   HEMATOCRIT % 47.4   MCV fL 85   PLATELETS Thousands/uL 339   , Coagulation:   Results from last 7 days   Lab Units 12/15/23  0923   INR  0.94     Imaging Studies: I have personally reviewed pertinent films in PACS  CTA stroke alert (head/neck)   Final Result by Kel Canales DO (12/15 0941)      No high-grade stenosis or occlusive disease of the cervical or intracranial vasculature. Minor atherosclerotic change of both carotid bifurcations.      Findings were directly discussed with Jaspal Jha at 9:37 a.m.                           Workstation performed: [id number]         CT stroke alert brain   Final Result by Kel Canales DO (12/15 0940)      No acute intracranial abnormality.      Findings were directly discussed with Jaspal Jha at 9:37 a.m.      Workstation performed: [id number]               EKG, Pathology, and Other Studies: I have personally reviewed pertinent reports.      VTE Prophylaxis: None yet, in ED    Code Status: No Order    Total Critical Care time spent 45 minutes excluding procedures, teaching and family updates. Discussed plan of care with patient and ED team: reviewed CT imaging, no acute findings, out of TNK time window (and with low NIHSS), admit under CVA pathway, DAPT/statin for now, neuro checks/telemetry, MRI/Echo.    Please note dictation software was used in the formulation of this note. Please keep that in mind in light of any grammatical errors.

## 2023-12-15 NOTE — H&P
[facility]  H&P  Name: Roberto Emerson 58 y.o. male I MRN: [id number]  Unit/Bed#: ED-28 I Date of Admission: 12/15/2023   Date of Service: 12/15/2023 I Hospital Day: 0      Assessment/Plan   * Stroke-like symptoms  Assessment & Plan  Right sided paresthesia and dysarthria  Stroke versus TIA  Initial Imaging negative. Symptoms resolved  Continue stroke workup with MRI, echo, PT/OT, neurology consult  He received ASA/plavix today  Start lipitor tonight. Resume asa/plavix tomorrow    Hypertension  Assessment & Plan  Not on DONNY meds prior to admission  Could be reactive due to stroke   Allow permissive HTN until MRI         VTE Pharmacologic Prophylaxis: VTE Score: 6 High Risk (Score >/= 5) - Pharmacological DVT Prophylaxis Ordered: enoxaparin (Lovenox). Sequential Compression Devices Ordered.  Code Status: No Order Full code    Anticipated Length of Stay: Patient will be admitted on an inpatient basis with an anticipated length of stay of greater than 2 midnights secondary to stroke workup.    Chief Complaint: right sided abnormal sensation    History of Present Illness:  Roberto Emerson is a 58 y.o. male with no ongoing chronic medical problems, not on any maintenance medications, active who presents with right sided paresthesia and dysarthria.  He was at his usual state of health when he woke up this morning with severe left-sided neck pain associated with an unusual sensation of his right arm and leg.  He was able to go to work at [employer] where he was reportedly noted to have slight dysarthria.  EMS was called and en route to the hospital he reportedly had right arm drift.  He was a stroke alert with initial CT and CTA showing no acute findings.  He received aspirin and Plavix in the ED and was referred to medicine for stroke workup.  At the time of my examination he had no further dysarthria or right-sided paresthesia.  He recalled having a normal week without any recent trauma to the  neck.  He did recall having a similar sensation 2 to 3 weeks ago which eventually resolved on its own.    Review of Systems:  Review of Systems   Constitutional:  Negative for chills and fever.   HENT: Negative.     Respiratory: Negative.     Cardiovascular: Negative.    Gastrointestinal: Negative.    Musculoskeletal: Negative.    Neurological:  Positive for speech difficulty.        See HPI   Psychiatric/Behavioral: Negative.     All other systems reviewed and are negative.      Past Medical and Surgical History:   Past Medical History:   Diagnosis Date    Chronic back pain     COVID 09/2021    Essential hypertension     Kidney stone     Lumbar herniated disc     Pneumonia     as child    Sciatica     Seasonal allergies        Past Surgical History:   Procedure Laterality Date    BACK SURGERY      FL RETROGRADE PYELOGRAM  2/17/2022    HAND SURGERY      NASAL SEPTUM SURGERY      DC CYSTO/URETERO W/LITHOTRIPSY &INDWELL STENT INSRT Right 2/17/2022    Procedure: CYSTO URETEROSCOPY W/ LITHO HOLMIUM LASER, RETROGRADE PYELOGRAM, RIGHT URETERAL STENT PLACEMENT;  Surgeon: Valentin Serrano MD;  [facility];  Service: Urology    DC LAMNOTMY INCL W/DCMPRSN NRV ROOT 1 INTRSPC LUMBR Left 11/7/2016    Procedure: L5-S1 MINIMALLY INVASIVE MICROSCOPIC DISCECTOMY AND POSSIBLE EPIDURAL STEROID INJECTION ;  Surgeon: Bobby Alvarez MD;  [facility];  Service: Neurosurgery    WRIST SURGERY         Meds/Allergies:  Prior to Admission medications    Medication Sig Start Date End Date Taking? Authorizing Provider   sildenafil (VIAGRA) 50 MG tablet Take 1 tablet by mouth on empty stomach one hour prior to sexual activity 5/24/22   Jada Dowling PA-C     I have reviewed home medications with patient personally.    Allergies:   Allergies   Allergen Reactions    Shellfish-Derived Products - Food Allergy Diarrhea and Vomiting       Social History:  Marital Status: /Civil Union   Occupation:   Patient  "Pre-hospital Living Situation: Home  Patient Pre-hospital Level of Mobility: walks  Patient Pre-hospital Diet Restrictions: none  Substance Use History:   Social History     Substance and Sexual Activity   Alcohol Use No     Social History     Tobacco Use   Smoking Status Never   Smokeless Tobacco Never     Social History     Substance and Sexual Activity   Drug Use No       Family History:  Family History   Problem Relation Age of Onset    Heart attack Mother     Kidney disease Father     Heart failure Father     Deep vein thrombosis Father     Urolithiasis Father        Physical Exam:     Vitals:   Blood Pressure: 144/86 (12/15/23 1030)  Pulse: 99 (12/15/23 1030)  Temperature: 98.2 °F (36.8 °C) (12/15/23 1015)  Temp Source: Oral (12/15/23 1015)  Respirations: 17 (12/15/23 1030)  Height: 6' 1\" (185.4 cm) (12/15/23 1015)  Weight - Scale: 85.9 kg (189 lb 6 oz) (12/15/23 1015)  SpO2: 96 % (12/15/23 1030)    Physical Exam  Vitals reviewed.   Constitutional:       General: He is not in acute distress.     Appearance: He is not ill-appearing, toxic-appearing or diaphoretic.   HENT:      Head: Normocephalic and atraumatic.      Nose: No congestion or rhinorrhea.   Eyes:      General: No scleral icterus.  Cardiovascular:      Rate and Rhythm: Normal rate and regular rhythm.   Pulmonary:      Breath sounds: No wheezing or rhonchi.   Abdominal:      General: There is no distension.      Palpations: Abdomen is soft.      Tenderness: There is no abdominal tenderness.   Musculoskeletal:      Cervical back: Neck supple.      Right lower leg: No edema.      Left lower leg: No edema.   Skin:     General: Skin is warm and dry.      Coloration: Skin is not jaundiced or pale.   Neurological:      Mental Status: He is oriented to person, place, and time.      Comments: Symmetrical motor movements  Fluent speech  Alert coherent   Psychiatric:         Mood and Affect: Mood normal.         Behavior: Behavior normal.        Additional " Data:     Lab Results:  Results from last 7 days   Lab Units 12/15/23  0923   WBC Thousand/uL 10.23*   HEMOGLOBIN g/dL 15.7   HEMATOCRIT % 47.4   PLATELETS Thousands/uL 339   NEUTROS PCT % 65   LYMPHS PCT % 20   MONOS PCT % 8   EOS PCT % 6     Results from last 7 days   Lab Units 12/15/23  0923   SODIUM mmol/L 137   POTASSIUM mmol/L 3.8   CHLORIDE mmol/L 101   CO2 mmol/L 29   BUN mg/dL 17   CREATININE mg/dL 1.01   ANION GAP mmol/L 7   CALCIUM mg/dL 9.9   ALBUMIN g/dL 4.4   TOTAL BILIRUBIN mg/dL 0.49   ALK PHOS U/L 142*   ALT U/L 16   AST U/L 16   GLUCOSE RANDOM mg/dL 97     Results from last 7 days   Lab Units 12/15/23  0923   INR  0.94     Results from last 7 days   Lab Units 12/15/23  0933   POC GLUCOSE mg/dl 93               Lines/Drains:  Invasive Devices       Peripheral Intravenous Line  Duration             Peripheral IV 12/15/23 Distal;Left;Upper;Ventral (anterior) Arm <1 day                        Imaging: Reviewed radiology reports from this admission including: CT head  CTA stroke alert (head/neck)   Final Result by Kel Canales DO (12/15 0941)      No high-grade stenosis or occlusive disease of the cervical or intracranial vasculature. Minor atherosclerotic change of both carotid bifurcations.      Findings were directly discussed with Jaspal Jha at 9:37 a.m.                           Workstation performed: [id number]         CT stroke alert brain   Final Result by Kel Canales DO (12/15 0940)      No acute intracranial abnormality.      Findings were directly discussed with Jaspal Jha at 9:37 a.m.      Workstation performed: [id number]             EKG and Other Studies Reviewed on Admission:   EKG: nsr    ** Please Note: This note has been constructed using a voice recognition system. **

## 2023-12-15 NOTE — LETTER
[facility], later [facility]  [address] [place] [address]  Dept: [phone]    December 17, 2023     Patient: Roberto Emerson   YOB: 1965   Date of Visit: 12/15/2023       To Whom it May Concern:    Roberto Emerson is under my professional care. He was seen in the hospital from 12/15/2023 to 12/17/23. He may return to work on 12/18/23 without limitations.    If you have any questions or concerns, please don't hesitate to call.         Sincerely,          Cathy Monique PA-C

## 2023-12-15 NOTE — CASE MANAGEMENT
Case Management Assessment & Discharge Planning Note    Patient name Roberto Emerson  Location ED-28/ED-28 MRN [id number]  : 1965 Date 12/15/2023       Current Admission Date: 12/15/2023  Current Admission Diagnosis:Stroke-like symptoms  Patient Active Problem List    Diagnosis Date Noted    Stroke-like symptoms 12/15/2023    Essential hypertension     COVID 2021    Lumbar disc herniation 2016    Lumbar radicular pain 2016      LOS (days): 0  Geometric Mean LOS (GMLOS) (days):   Days to GMLOS:     OBJECTIVE:              Current admission status: Inpatient       Preferred Pharmacy:   [id number] - [place] - [address]  [address]  [place] [address]  Phone: [phone] Fax: [phone]    Primary Care Provider: Fouzia Canales DO    Primary Insurance: BLUE CROSS  Secondary Insurance:     ASSESSMENT:  Active Health Care Proxies    There are no active Health Care Proxies on file.       Advance Directives  Does patient have a Health Care POA?: No  Was patient offered paperwork?: Yes (Pt interested, CM to provide)  Does patient currently have a Health Care decision maker?: No  Does patient have Advance Directives?: No  Was patient offered paperwork?: Yes (Pt interested CM to provide)         Readmission Root Cause  30 Day Readmission: No    Patient Information  Admitted from:: Home  Mental Status: Alert  During Assessment patient was accompanied by: Not accompanied during assessment  Assessment information provided by:: Patient  Primary Caregiver: Self  Support Systems: Self, Family members, Spouse/significant other  County of Residence: [place]  What city do you live in?: [place]  Home entry access options. Select all that apply.: Stairs  Number of steps to enter home.: 1  Do the steps have railings?: No  Type of Current Residence: [facility]  Living Arrangements: Lives w/ Spouse/significant other, Other (Comment) (Wife and children)  Is patient a ?: No    Activities  of Daily Living Prior to Admission  Functional Status: Independent  Completes ADLs independently?: Yes  Ambulates independently?: Yes  Does patient use assisted devices?: No  Does patient currently own DME?: No  Does patient have a history of Outpatient Therapy (PT/OT)?: Yes (sp back and wrist surgery with [facility] last was 3-4yrs ago)  Does the patient have a history of Short-Term Rehab?: No  Does patient have a history of HHC?: No  Does patient currently have HHC?: No         Patient Information Continued  Income Source: Employed  Does patient have prescription coverage?: Yes  Does patient receive dialysis treatments?: No  Does patient have a history of substance abuse?: No  Does patient have a history of Mental Health Diagnosis?: No    PHQ 2/9 Screening   Reviewed PHQ 2/9 Depression Screening Score?: Yes    Means of Transportation  Means of Transport to Appts:: Drives Self  [place] Application:  (NA)      Housing Stability: Low Risk  (12/15/2023)    Housing Stability Vital Sign     Unable to Pay for Housing in the Last Year: No     Number of Places Lived in the Last Year: 1     Unstable Housing in the Last Year: No   Food Insecurity: No Food Insecurity (12/15/2023)    Hunger Vital Sign     Worried About Running Out of Food in the Last Year: Never true     Ran Out of Food in the Last Year: Never true   Transportation Needs: No Transportation Needs (12/15/2023)    PRAPARE - Transportation     Lack of Transportation (Medical): No     Lack of Transportation (Non-Medical): No   Utilities: Not At Risk (12/15/2023)    AHC Utilities     Threatened with loss of utilities: No       DISCHARGE DETAILS:    Discharge planning discussed with:: Patient  Freedom of Choice: Yes      Treatment Team Recommendation: Other (IP admission)         Additional Comments: CM engaged pt at bedside. Pt lives ind in a 1 story home with wife and children. Endorses hxo OPPT 3-4 years ago, denies STR HHA, and DME. Pts family will transport him home at  [place]. CM provided POA and living will paperwork per pts request. Pt verbalized understanding, denies questions or concerns. No CM needs present at this time. CM dept to continue following.

## 2023-12-15 NOTE — ASSESSMENT & PLAN NOTE
Not on DONNY meds prior to admission  Could be reactive due to stroke   Allow permissive HTN until MRI

## 2023-12-15 NOTE — SPEECH THERAPY NOTE
Consult received and chart reviewed. Per chart review, and discussion with RN, pt passed RN dysphagia assessment and is tolerating regular diet with thin liquids with no s/s of aspiration.  NIH 1 for mild facial sensation deficit. No dysarthria or language deficits noted or reported. Therefore, formal speech/swallow evaluation not indicated at this time. If new concerns arise, please reconsult.     Analy Flood M.S., CCC-SLP  Speech Language Pathologist   Available via [employer]  [place] #[id number]  [place] #[id number]

## 2023-12-16 PROBLEM — I63.9 ACUTE CVA (CEREBROVASCULAR ACCIDENT) (HCC): Status: ACTIVE | Noted: 2023-12-15

## 2023-12-16 LAB
CHOLEST SERPL-MCNC: 142 MG/DL
EST. AVERAGE GLUCOSE BLD GHB EST-MCNC: 117 MG/DL
HBA1C MFR BLD: 5.7 %
HDLC SERPL-MCNC: 31 MG/DL
LDLC SERPL CALC-MCNC: 88 MG/DL (ref 0–100)
TRIGL SERPL-MCNC: 114 MG/DL

## 2023-12-16 PROCEDURE — 83036 HEMOGLOBIN GLYCOSYLATED A1C: CPT | Performed by: INTERNAL MEDICINE

## 2023-12-16 PROCEDURE — 99291 CRITICAL CARE FIRST HOUR: CPT | Performed by: PHYSICIAN ASSISTANT

## 2023-12-16 PROCEDURE — 99232 SBSQ HOSP IP/OBS MODERATE 35: CPT | Performed by: PHYSICIAN ASSISTANT

## 2023-12-16 PROCEDURE — 97163 PT EVAL HIGH COMPLEX 45 MIN: CPT

## 2023-12-16 PROCEDURE — 97166 OT EVAL MOD COMPLEX 45 MIN: CPT

## 2023-12-16 PROCEDURE — 80061 LIPID PANEL: CPT | Performed by: INTERNAL MEDICINE

## 2023-12-16 RX ADMIN — CLOPIDOGREL BISULFATE 75 MG: 75 TABLET ORAL at 08:20

## 2023-12-16 RX ADMIN — ENOXAPARIN SODIUM 40 MG: 40 INJECTION SUBCUTANEOUS at 08:20

## 2023-12-16 RX ADMIN — ASPIRIN 81 MG: 81 TABLET, COATED ORAL at 08:20

## 2023-12-16 RX ADMIN — ATORVASTATIN CALCIUM 40 MG: 40 TABLET, FILM COATED ORAL at 17:49

## 2023-12-16 NOTE — OCCUPATIONAL THERAPY NOTE
Occupational Therapy Evaluation     Patient Name: Roberto Emerson  Today's Date: 12/16/2023  Problem List  Principal Problem:    Stroke-like symptoms  Active Problems:    Hypertension    Past Medical History  Past Medical History:   Diagnosis Date    Chronic back pain     COVID 09/2021    Essential hypertension     Kidney stone     Lumbar herniated disc     Pneumonia     as child    Sciatica     Seasonal allergies      Past Surgical History  Past Surgical History:   Procedure Laterality Date    BACK SURGERY      FL RETROGRADE PYELOGRAM  2/17/2022    HAND SURGERY      NASAL SEPTUM SURGERY      OR CYSTO/URETERO W/LITHOTRIPSY &INDWELL STENT INSRT Right 2/17/2022    Procedure: CYSTO URETEROSCOPY W/ LITHO HOLMIUM LASER, RETROGRADE PYELOGRAM, RIGHT URETERAL STENT PLACEMENT;  Surgeon: Valentin Serrano MD;  Location: [facility];  Service: Urology    OR LAMNOTMY INCL W/DCMPRSN NRV ROOT 1 INTRSPC LUMBR Left 11/7/2016    Procedure: L5-S1 MINIMALLY INVASIVE MICROSCOPIC DISCECTOMY AND POSSIBLE EPIDURAL STEROID INJECTION ;  Surgeon: Bobby Alvarez MD;  Location: [facility];  Service: Neurosurgery    WRIST SURGERY             12/16/23 0805   OT Last Visit   OT Visit Date 12/16/23   Note Type   Note type Evaluation   Pain Assessment   Pain Assessment Tool 0-10   Pain Score No Pain   Restrictions/Precautions   Weight Bearing Precautions Per Order No   Other Precautions   (None)   Home Living   Type of Home House   Home Layout One level  (1 SH with 1 JOHN)   Bathroom Shower/Tub Tub/shower unit  (and walk-in shower)   Bathroom Toilet Standard   Bathroom Equipment Grab bars in shower;Built-in shower seat   Bathroom Accessibility Accessible   Prior Function   Level of [place] Independent with ADLs;Independent with functional mobility;Independent with IADLS   Lives With Spouse;Family   IADLs Independent with driving;Independent with meal prep;Independent with medication management   Falls in the last 6 months 0   Vocational Full  "time employment   Comments Employed with [employer]   Subjective   Subjective \"I feel completely back to normal.\"   ADL   Where Assessed Standing at sink   UB Bathing Assistance 7  Independent   LB Bathing Assistance 7  Independent   LB Dressing Assistance 7  Independent   Toileting Assistance  7  Independent   Bed Mobility   Supine to Sit 7  Independent   Transfers   Sit to Stand 7  Independent   Stand to Sit 7  Independent   Toilet transfer 7  Independent   Functional Mobility   Functional Mobility 7  Independent   Additional Comments bed <-> bathroom   Additional items   (without AD)   Balance   Static Sitting Normal   Dynamic Sitting Normal   Static Standing Normal   Dynamic Standing Normal   Activity Tolerance   Activity Tolerance Patient tolerated treatment well   Nurse Made Aware RN   RUE Assessment   RUE Assessment WFL  (5/5 throughout)   LUE Assessment   LUE Assessment WFL  (5/5 throughout)   Hand Function   Gross Motor Coordination Functional   Fine Motor Coordination Functional   Sensation   Light Touch No apparent deficits   Sharp/Dull No apparent deficits   Stereognosis No apparent deficits   Proprioception   Proprioception No apparent deficits   Vision - Complex Assessment   Tracking Intact   Saccades Intact   Acuity Able to read employee name badge without difficulty;Able to read clock/calendar on wall without difficulty   Cognition   Overall Cognitive Status WFL   Arousal/Participation Alert;Cooperative   Attention Within functional limits   Orientation Level Oriented X4   Memory Within functional limits   Following Commands Follows all commands and directions without difficulty   Assessment   Assessment Mr. Emerson is a 58 y.o. male seen for OT evaluation s/p admit to [facility] on 12/15/2023 w/ Stroke-like symptoms. MRI revealed left thalamic infarcts. Comorbidities affecting pt's functional performance at time of assessment include: HTN. Pt with active OT orders and activity orders for Ambulate. Personal " factors affecting pt at time of IE include:JOHN home environment. Prior to admission, pt was IND with all ADLs, IADLs, functional mobility without AD, driving, and employed full-time. Upon evaluation: Pt completed AM ADL routine at Mod I level while seated EOB and standing at the sink without AD 2* the following deficits impacting occupational performance:decreased activity tolerance. Pt to benefit from continued skilled OT tx while in the hospital to address deficits as defined above and maximize level of functional independence w ADL's and functional mobility. From OT standpoint, recommendation at time of d/c would be home with no further OT needs. OT evaluation only at this time. Please re-consult if new needs arise.   Discharge Recommendation   Rehab Resource Intensity Level, OT No post-acute rehabilitation needs   AM-PAC Daily Activity Inpatient   Lower Body Dressing 4   Bathing 4   Toileting 4   Upper Body Dressing 4   Grooming 4   Eating 4   Daily Activity Raw Score 24   Daily Activity Standardized Score (Calc for Raw Score >=11) 57.54   AM-PAC Applied Cognition Inpatient   Following a Speech/Presentation 4   Understanding Ordinary Conversation 4   Taking Medications 4   Remembering Where Things Are Placed or Put Away 4   Remembering List of 4-5 Errands 4   Taking Care of Complicated Tasks 4   Applied Cognition Raw Score 24   Applied Cognition Standardized Score 62.21     Assessment:   The patient's raw score on the -PAC Daily Activity Inpatient Short Form is 24. A raw score of greater than or equal to 19 suggests the patient may benefit from discharge to home. Please refer to the recommendation of the Occupational Therapist for safe discharge planning.     OT evaluation only. Pt appears to be functioning at baseline with ADLs and other functional tasks. Please re-consult occupational therapy services if new needs arise or acute changes.     Marilee Fuller, OTR/L

## 2023-12-16 NOTE — PLAN OF CARE
Problem: PAIN - ADULT  Goal: Verbalizes/displays adequate comfort level or baseline comfort level  Description: Interventions:  - Encourage patient to monitor pain and request assistance  - Assess pain using appropriate pain scale  - Administer analgesics based on type and severity of pain and evaluate response  - Implement non-pharmacological measures as appropriate and evaluate response  - Consider cultural and social influences on pain and pain management  - Notify physician/advanced practitioner if interventions unsuccessful or patient reports new pain  Outcome: Progressing     Problem: INFECTION - ADULT  Goal: Absence or prevention of progression during hospitalization  Description: INTERVENTIONS:  - Assess and monitor for signs and symptoms of infection  - Monitor lab/diagnostic results  - Monitor all insertion sites, i.e. indwelling lines, tubes, and drains  - Monitor endotracheal if appropriate and nasal secretions for changes in amount and color  - [place] appropriate cooling/warming therapies per order  - Administer medications as ordered  - Instruct and encourage patient and family to use good hand hygiene technique  - Identify and instruct in appropriate isolation precautions for identified infection/condition  Outcome: Progressing  Goal: Absence of fever/infection during neutropenic period  Description: INTERVENTIONS:  - Monitor WBC    Outcome: Progressing     Problem: SAFETY ADULT  Goal: Patient will remain free of falls  Description: INTERVENTIONS:  - Educate patient/family on patient safety including physical limitations  - Instruct patient to call for assistance with activity   - Consult OT/PT to assist with strengthening/mobility   - Keep Call bell within reach  - Keep bed low and locked with side rails adjusted as appropriate  - Keep care items and personal belongings within reach  - Initiate and maintain comfort rounds  - Make Fall Risk Sign visible to staff  - Offer Toileting every 2 Hours,  in advance of need  - Initiate/Maintain bed alarm  - Obtain necessary fall risk management equipment:   - Apply yellow socks and bracelet for high fall risk patients  - Consider moving patient to room near nurses station  Outcome: Progressing  Goal: Maintain or return to baseline ADL function  Description: INTERVENTIONS:  -  Assess patient's ability to carry out ADLs; assess patient's baseline for ADL function and identify physical deficits which impact ability to perform ADLs (bathing, care of mouth/teeth, toileting, grooming, dressing, etc.)  - Assess/evaluate cause of self-care deficits   - Assess range of motion  - Assess patient's mobility; develop plan if impaired  - Assess patient's need for assistive devices and provide as appropriate  - Encourage maximum independence but intervene and supervise when necessary  - Involve family in performance of ADLs  - Assess for home care needs following discharge   - Consider OT consult to assist with ADL evaluation and planning for discharge  - Provide patient education as appropriate  Outcome: Progressing  Goal: Maintains/Returns to pre admission functional level  Description: INTERVENTIONS:  - Perform AM-PAC 6 Click Basic Mobility/ Daily Activity assessment daily.  - Set and communicate daily mobility goal to care team and patient/family/caregiver.   - Collaborate with rehabilitation services on mobility goals if consulted  - Perform Range of Motion 3 times a day.  - Reposition patient every 2 hours.  - Dangle patient 3 times a day  - Stand patient 3 times a day  - Ambulate patient 3 times a day  - Out of bed to chair 3 times a day   - Out of bed for meals 3 times a day  - Out of bed for toileting  - Record patient progress and toleration of activity level   Outcome: Progressing     Problem: DISCHARGE PLANNING  Goal: Discharge to home or other facility with appropriate resources  Description: INTERVENTIONS:  - Identify barriers to discharge w/patient and caregiver  -  Arrange for needed discharge resources and transportation as appropriate  - Identify discharge learning needs (meds, wound care, etc.)  - Arrange for interpretive services to assist at discharge as needed  - Refer to Case Management Department for coordinating discharge planning if the patient needs post-hospital services based on physician/advanced practitioner order or complex needs related to functional status, cognitive ability, or social support system  Outcome: Progressing

## 2023-12-16 NOTE — PROGRESS NOTES
"Progress Note - Neurology   Roberto Emerson 58 y.o. male MRN: [id number]  Unit/Bed#: E4 -01 Encounter: [id number]      Assessment/Plan     * Acute CVA (cerebrovascular accident) (HCC)  Assessment & Plan  58-year-old male with no pertinent past medical history nor daily medications, presenting as prehospital stroke alert on 12/15 after awakening with severe left-sided neck pain as well as right-sided atypical sensation to the extremities (felt \"asleep\").  While at work, coworkers also noted slight dysarthria.  Slight right upper extremity drift per EMS.    Of note, patient notes similar episode of R sided abnormal sensation happened approximately 2 to 3 weeks ago (lasting several hours before resolving).    Hypertensive in route per EMS (200s/100s) NIHSS of 1 during alert. No acute findings on neuroimaging.    MRI brain yesterday confirmed L thalamic infarction, appears small vessel in etiology given location  12/16: R sided symptoms have resolved, non-focal neuro exam.    Neuroimaging during alert:  -CT head negative for acute intracranial pathology  -CTA head/neck negative for LVO, no other flow restrictive disease/vascular abnormalities.   -MRI brain with L thalamic infarction    Plan:  -acute ischemic stroke pathway ongoing:  -2D echocardiogram pending  -continue DAPT (aspirin 81 mg/plavix 75 mg); likely for 3 weeks, followed by aspirin monotherapy  -continue lipitor 40 mg daily  -hemoglobin A1C pending, lipid panel with LDL of 88  -can pursue normotensive goals  -neuro checks  -telemetry monitoring  -provide stroke education  -therapy evaluations  -stroke/vascular neurology follow up to be scheduled once patient medically ready for discharge    Once 2D echo reviewed, no further inpatient neurologic workup anticipated. Discussed plan of care with attending neurologist.    Roberto Emerson will need follow up in in 6 weeks with neurovascular attending or advance practitioner. He will not require outpatient " "neurological testing.    Subjective:   Patient resting in bed, doing well this morning. Notes resolution of R sided symptoms, no new/interim changes otherwise.    Vitals: Blood pressure 127/86, pulse 99, temperature 97.9 °F (36.6 °C), temperature source Temporal, resp. rate 18, height 6' 1\" (1.854 m), weight 84 kg (185 lb 3 oz), SpO2 96%.,Body mass index is 24.43 kg/m².    Examined alongside Dr. Jha.    Physical Exam:   Physical Exam  Constitutional:       Appearance: Normal appearance.   HENT:      Head: Normocephalic and atraumatic.   Eyes:      Extraocular Movements: Extraocular movements intact and EOM normal.      Conjunctiva/sclera: Conjunctivae normal.      Pupils: Pupils are equal, round, and reactive to light.   Cardiovascular:      Rate and Rhythm: Normal rate and regular rhythm.   Pulmonary:      Effort: Pulmonary effort is normal.   Abdominal:      General: There is no distension.   Musculoskeletal:         General: Normal range of motion.      Cervical back: Normal range of motion and neck supple.   Skin:     General: Skin is warm and dry.   Neurological:      General: No focal deficit present.      Mental Status: He is alert and oriented to person, place, and time.      Motor: Motor strength is normal.     Coordination: Finger-Nose-Finger Test and Heel to Shin Test normal.   Psychiatric:         Speech: Speech normal.        Neurologic Exam     Mental Status   Oriented to person, place, and time.   Attention: normal. Concentration: normal.   Speech: speech is normal   Normal comprehension.     Cranial Nerves     CN II   Visual fields full to confrontation.     CN III, IV, VI   Pupils are equal, round, and reactive to light.  Extraocular motions are normal.     CN V   Facial sensation intact.     CN VII   Facial expression full, symmetric.     CN VIII   CN VIII normal.     CN IX, X   CN IX normal.   CN X normal.     CN XI   CN XI normal.     CN XII   CN XII normal.     Motor Exam     Strength "   Strength 5/5 throughout.     Sensory Exam   Light touch normal.     Gait, Coordination, and Reflexes     Coordination   Finger to nose coordination: normal  Heel to shin coordination: normal    Tremor   Resting tremor: absent  Intention tremor: absent       Lab, Imaging and other studies:   MRI brain wo contrast   Final Result by Sandra Valencia MD (12/15 1546)   Addendum (preliminary) 1 of 1 by Sandra Valencia MD (12/15 1546)   ADDENDUM:      Upon further review of the images there is mild restricted diffusion    involving left posterior internal capsule and left lateral thalamus with    FLAIR hyperintensity consistent with acute infarct.               Findings discussed via [place] text with Dr. Jaspal Williamson at 3:40 p.m.      Final      1.  No acute ischemia. No significant white matter change.   2.  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 There is no intrinsic susceptibility signal or T1 hyperintensity to suggest mineralization or blood products in a complex pineal cyst. Recommend further characterization with    contrast-enhanced brain MRI.            This study was marked in EPIC for notification and follow-up.               Workstation performed: [id number]         CTA stroke alert (head/neck)   Final Result by Kel Canales DO (12/15 0941)      No high-grade stenosis or occlusive disease of the cervical or intracranial vasculature. Minor atherosclerotic change of both carotid bifurcations.      Findings were directly discussed with Jaspal Jha at 9:37 a.m.                           Workstation performed: [id number]         CT stroke alert brain   Final Result by Kel Canales DO (12/15 0940)      No acute intracranial abnormality.      Findings were directly discussed with Jaspal Jha at 9:37 a.m.      Workstation performed: [id number]             CBC:   Results from last 7 days   Lab Units 12/15/23  0923   WBC Thousand/uL 10.23*   RBC Million/uL  5.59   HEMOGLOBIN g/dL 15.7   HEMATOCRIT % 47.4   MCV fL 85   PLATELETS Thousands/uL 339   , BMP/CMP:   Results from last 7 days   Lab Units 12/15/23  0923   SODIUM mmol/L 137   POTASSIUM mmol/L 3.8   CHLORIDE mmol/L 101   CO2 mmol/L 29   BUN mg/dL 17   CREATININE mg/dL 1.01   CALCIUM mg/dL 9.9   AST U/L 16   ALT U/L 16   ALK PHOS U/L 142*   EGFR ml/min/1.73sq m 82   , Vitamin B12:   , HgBA1C:   , TSH:   , Coagulation:   Results from last 7 days   Lab Units 12/15/23  0923   INR  0.94   , Lipid Profile:   Results from last 7 days   Lab Units 12/16/23  0511   HDL mg/dL 31*   LDL CALC mg/dL 88   TRIGLYCERIDES mg/dL 114     VTE Prophylaxis: Sequential compression device (Venodyne)  and Enoxaparin (Lovenox)    Total time spent today 20 minutes. Discussed plan of care with patient and primary team: reviewed MRI brain, awaiting 2D echo, DAPT/statin, BP monitoring, neuro checks, OP stroke clinic follow up.

## 2023-12-16 NOTE — ASSESSMENT & PLAN NOTE
Right sided paresthesia and dysarthria  MRI with evidence of left posterior internal capsule and left lateral thalamus with FLAIR hyperintensity consistent with acute infarct  Symptoms completely resolved since admission  Seen in consult by neurology  Follow-up echocardiogram  Continue aspirin, statin, Plavix

## 2023-12-16 NOTE — PROGRESS NOTES
[facility]  Progress Note  Name: Roberto Emerson I  MRN: [id number]  Unit/Bed#: E4 -01 I Date of Admission: 12/15/2023   Date of Service: 12/16/2023 I Hospital Day: 1    Assessment/Plan   * Acute CVA (cerebrovascular accident) (HCC)  Assessment & Plan  Right sided paresthesia and dysarthria  MRI with evidence of left posterior internal capsule and left lateral thalamus with FLAIR hyperintensity consistent with acute infarct  Symptoms completely resolved since admission  Seen in consult by neurology  Follow-up echocardiogram  Continue aspirin, statin, Plavix    Hypertension  Assessment & Plan  Not on BP meds prior to admission  Could be reactive due to stroke   BP normalized today             VTE Pharmacologic Prophylaxis: VTE Score: 6 High Risk (Score >/= 5) - Pharmacological DVT Prophylaxis Ordered: enoxaparin (Lovenox). Sequential Compression Devices Ordered.    Mobility:   Basic Mobility Inpatient Raw Score: 24  JH-HLM Goal: 8: Walk 250 feet or more  JH-HLM Achieved: 8: Walk 250 feet ot more  HLM Goal achieved. Continue to encourage appropriate mobility.    Patient Centered Rounds: I performed bedside rounds with nursing staff today.   Discussions with Specialists or Other Care Team Provider: none    Education and Discussions with Family / Patient:  patient.     Total Time Spent on Date of Encounter in care of patient: This time was spent on one or more of the following: performing physical exam; counseling and coordination of care; obtaining or reviewing history; documenting in the medical record; reviewing/ordering tests, medications or procedures; communicating with other healthcare professionals and discussing with patient's family/caregivers.    Current Length of Stay: 1 day(s)  Current Patient Status: Inpatient   Certification Statement: The patient will continue to require additional inpatient hospital stay due to CVA work up   Discharge Plan: Anticipate discharge later  today or tomorrow to home.    Code Status: Level 1 - Full Code    Subjective:   Patient notes he is feeling very well today. Denies any recurrent symptoms. Eating and drinking well. Ambulating appropriately.     Objective:     Vitals:   Temp (24hrs), Av.8 °F (36.6 °C), Min:97.1 °F (36.2 °C), Max:98.6 °F (37 °C)    Temp:  [97.1 °F (36.2 °C)-98.6 °F (37 °C)] 97.9 °F (36.6 °C)  HR:  [] 99  Resp:  [17-18] 18  BP: (126-158)/(73-96) 127/86  SpO2:  [94 %-98 %] 96 %  Body mass index is 24.43 kg/m².     Input and Output Summary (last 24 hours):   No intake or output data in the 24 hours ending 23 1159    Physical Exam:   Physical Exam  Vitals reviewed.   Constitutional:       General: He is not in acute distress.  HENT:      Head: Normocephalic and atraumatic.   Eyes:      General: No scleral icterus.     Conjunctiva/sclera: Conjunctivae normal.   Cardiovascular:      Rate and Rhythm: Normal rate and regular rhythm.      Heart sounds: No murmur heard.  Pulmonary:      Effort: Pulmonary effort is normal. No respiratory distress.      Breath sounds: Normal breath sounds. No wheezing.   Abdominal:      General: Bowel sounds are normal. There is no distension.      Palpations: Abdomen is soft.      Tenderness: There is no abdominal tenderness.   Musculoskeletal:      Cervical back: Neck supple.      Right lower leg: No edema.      Left lower leg: No edema.   Skin:     General: Skin is warm and dry.   Neurological:      Mental Status: He is alert and oriented to person, place, and time.   Psychiatric:         Mood and Affect: Mood normal.         Behavior: Behavior normal.          Additional Data:     Labs:  Results from last 7 days   Lab Units 12/15/23  0923   WBC Thousand/uL 10.23*   HEMOGLOBIN g/dL 15.7   HEMATOCRIT % 47.4   PLATELETS Thousands/uL 339   NEUTROS PCT % 65   LYMPHS PCT % 20   MONOS PCT % 8   EOS PCT % 6     Results from last 7 days   Lab Units 12/15/23  0923   SODIUM mmol/L 137   POTASSIUM mmol/L  3.8   CHLORIDE mmol/L 101   CO2 mmol/L 29   BUN mg/dL 17   CREATININE mg/dL 1.01   ANION GAP mmol/L 7   CALCIUM mg/dL 9.9   ALBUMIN g/dL 4.4   TOTAL BILIRUBIN mg/dL 0.49   ALK PHOS U/L 142*   ALT U/L 16   AST U/L 16   GLUCOSE RANDOM mg/dL 97     Results from last 7 days   Lab Units 12/15/23  0923   INR  0.94     Results from last 7 days   Lab Units 12/15/23  0933   POC GLUCOSE mg/dl 93               Lines/Drains:  Invasive Devices       Peripheral Intravenous Line  Duration             Peripheral IV 12/15/23 Distal;Left;Upper;Ventral (anterior) Arm 1 day                      Telemetry:  Telemetry Orders (From admission, onward)               24 Hour Telemetry Monitoring  Continuous x 24 Hours (Telem)           Question:  Reason for 24 Hour Telemetry  Answer:  TIA/Suspected CVA/ Confirmed CVA                     Telemetry Reviewed: Normal Sinus Rhythm  Indication for Continued Telemetry Use: Acute CVA             Imaging: Reviewed radiology reports from this admission including: MRI brain    Recent Cultures (last 7 days):         Last 24 Hours Medication List:   Current Facility-Administered Medications   Medication Dose Route Frequency Provider Last Rate    aspirin  81 mg Oral Daily Travon Pichardo MD      atorvastatin  40 mg Oral QPM Travon Pichardo MD      clopidogrel  75 mg Oral Daily Travon Pichardo MD      enoxaparin  40 mg Subcutaneous Daily Travon Pichardo MD          Today, Patient Was Seen By: Cathy Monique PA-C    **Please Note: This note may have been constructed using a voice recognition system.**

## 2023-12-16 NOTE — PHYSICAL THERAPY NOTE
Physical Therapy Evaluation:    2 forms of pt ID verified:name,birthdate and pt ID fred    Patient's Name: Roberto Emerson    Admitting Diagnosis  Stroke (cerebrum) ([facility]) [I63.9]  Stroke-like symptoms [R29.90]    Problem List  Patient Active Problem List   Diagnosis    Lumbar disc herniation    Lumbar radicular pain    Hypertension    COVID    Acute CVA (cerebrovascular accident) ([facility])       Past Medical History  Past Medical History:   Diagnosis Date    Chronic back pain     COVID 09/2021    Essential hypertension     Kidney stone     Lumbar herniated disc     Pneumonia     as child    Sciatica     Seasonal allergies        Past Surgical History  Past Surgical History:   Procedure Laterality Date    BACK SURGERY      FL RETROGRADE PYELOGRAM  2/17/2022    HAND SURGERY      NASAL SEPTUM SURGERY      HI CYSTO/URETERO W/LITHOTRIPSY &INDWELL STENT INSRT Right 2/17/2022    Procedure: CYSTO URETEROSCOPY W/ LITHO HOLMIUM LASER, RETROGRADE PYELOGRAM, RIGHT URETERAL STENT PLACEMENT;  Surgeon: Valentin Serrano MD;  Location: [facility];  Service: Urology    HI LAMNOTMY INCL W/DCMPRSN NRV ROOT 1 INTRSPC LUMBR Left 11/7/2016    Procedure: L5-S1 MINIMALLY INVASIVE MICROSCOPIC DISCECTOMY AND POSSIBLE EPIDURAL STEROID INJECTION ;  Surgeon: Bobby Alvarez MD;  Location: [facility];  Service: Neurosurgery    WRIST SURGERY              12/16/23 0935   PT Last Visit   PT Visit Date 12/16/23   Note Type   Note type Evaluation   Pain Assessment   Pain Assessment Tool 0-10   Pain Score No Pain   Restrictions/Precautions   Other Precautions Telemetry;Impulsive   Home Living   Type of Home House   Home Layout One level;Laundry in basement;Stairs to enter without rails  (basement area that pt does not have to use upon DC, 1 JOHN)   Home Equipment Other (Comment)  (no DME use PTA and pt reports not owning any DME at home)   Additional Comments pt lives with wife and 21 yo daughter in 1 [place], 1 JOHN, reports no recent falls and works  "fulltime at [employer] as a . Pt reports being completely (I) with mobility, ADLs and IADLs PTA, (+)drive   Prior Function   Level of [place] Independent with ADLs;Independent with functional mobility;Independent with IADLS   Lives With Spouse;Family  (family available to A pt upon DC per pt as needed)   Receives Help From Family  (as needed per pt)   IADLs Independent with driving;Independent with meal prep;Independent with medication management   Falls in the last 6 months 0  (per pt)   Vocational Full time employment   Comments works as a  at [employer]   General   Additional Pertinent History r/o stroke vs TIA, stroke like symptoms; upon arrival to the hospital (+)slurred speech and R UE drift->symptoms resolved   Family/Caregiver Present No   Cognition   Overall Cognitive Status WFL   Arousal/Participation Cooperative   Attention Within functional limits   Orientation Level Oriented X4   Following Commands Follows one step commands without difficulty   Comments pt is impulsive at times   Subjective   Subjective Pt supine in bed resting comfortably; pt willing and agreeable to work with PT and to participate in therapy intervention; \"I'm much better now, man that was rough while I was at work and started to not feel well\".   RLE Assessment   RLE Assessment   (at least 5/5 grossly throughout)   LLE Assessment   LLE Assessment   (at least 5/5 grossly throughout)   Vision-Basic Assessment   Current Vision Wears glasses all the time   Coordination   Sensation WFL   Light Touch   RLE Light Touch Grossly intact   LLE Light Touch Grossly intact   Bed Mobility   Supine to Sit 6  Modified independent   Transfers   Sit to Stand 6  Modified independent   Stand to Sit 6  Modified independent   Ambulation/Elevation   Gait pattern WNL  (impulsive at times)   Gait Assistance 6  Modified independent   Assistive Device None   Distance 190 feet without use of DME; no LOB noted and/or observed during upright " mobility; no reports of SOB and fatigue following ambulation and gait assessment   Balance   Static Sitting Fair +   Dynamic Sitting Fair +   Static Standing Fair +   Dynamic Standing Fair +   Ambulatory Fair +   Activity Tolerance   Activity Tolerance   (good)   Nurse Made Aware yes   Assessment   Prognosis Good   Assessment Pt is a 59 yo male admitted to [facility] 2* stroke like symptoms, r/o stroke vs TIA, reports RUE drift and slurred speech. Pt lives with supportive wife and family in 1 , basement area that pt does not have to use, 1 [facility], reports no recent falls and reports being (I) with mobility, IADLs and ADLs PTA. Pt currently is at functional mobility baseline, no use of DME during ambulation, no reports of pain at rest and during activity, no LOB noted and/or observed during ambulation and gait assessment. Pt is impulsive at times. Pt currently is at functional mobility baseline, no skilled inpt PT services needed at this time. DC pt from skilled inpt PT. Pts symptoms of RUE drift and slurred speech has resolved. Pt is in agreement with PT POC at this time. Recommend daily mobility, ambulation and OOB via non PT staff and restorative therapy aide.   Goals   Patient Goals to get home   Short Term Goal #1 no goals at this time 2* DC pt from skilled inpt PT   PT Treatment Day 0   Plan   PT Frequency   (DC from skilled inpt PT)   Discharge Recommendation   Rehab Resource Intensity Level, PT No post-acute rehabilitation needs   AM-PAC Basic Mobility Inpatient   Turning in Flat Bed Without Bedrails 4   Lying on Back to Sitting on Edge of Flat Bed Without Bedrails 4   Moving Bed to Chair 4   Standing Up From Chair Using Arms 4   Walk in Room 4   Climb 3-5 Stairs With Railing 3   Basic Mobility Inpatient Raw Score 23   Basic Mobility Standardized Score 50.88   Highest Level Of Mobility   JH-HLM Goal 7: Walk 25 feet or more   JH-HLM Achieved 7: Walk 25 feet or more     The patient's AM-PAC Basic Mobility Inpatient  Short Form Raw Score is 23A Raw score of greater than or equal to 16 suggests the patient may benefit from discharge to home. Please also refer to the recommendation of the Physical Therapist for safe discharge planning.         @Beth Borges PT, DPT@

## 2023-12-17 ENCOUNTER — APPOINTMENT (INPATIENT)
Dept: NON INVASIVE DIAGNOSTICS | Facility: HOSPITAL | Age: 58
End: 2023-12-17
Payer: COMMERCIAL

## 2023-12-17 VITALS
OXYGEN SATURATION: 96 % | DIASTOLIC BLOOD PRESSURE: 85 MMHG | SYSTOLIC BLOOD PRESSURE: 135 MMHG | TEMPERATURE: 97.8 F | RESPIRATION RATE: 18 BRPM | HEART RATE: 99 BPM | BODY MASS INDEX: 25.05 KG/M2 | WEIGHT: 189 LBS | HEIGHT: 73 IN

## 2023-12-17 LAB
AORTIC ROOT: 3.2 CM
APICAL FOUR CHAMBER EJECTION FRACTION: 61 %
E WAVE DECELERATION TIME: 164 MS
E/A RATIO: 0.69
FRACTIONAL SHORTENING: 44 (ref 28–44)
INTERVENTRICULAR SEPTUM IN DIASTOLE (PARASTERNAL SHORT AXIS VIEW): 0.9 CM
INTERVENTRICULAR SEPTUM: 0.9 CM (ref 0.6–1.1)
LAAS-AP2: 9.5 CM2
LAAS-AP4: 10.7 CM2
LEFT ATRIUM AREA SYSTOLE SINGLE PLANE A4C: 10.7 CM2
LEFT ATRIUM SIZE: 3.1 CM
LEFT ATRIUM VOLUME (MOD BIPLANE): 23 ML
LEFT ATRIUM VOLUME INDEX (MOD BIPLANE): 11.1 ML/M2
LEFT INTERNAL DIMENSION IN SYSTOLE: 2.5 CM (ref 2.1–4)
LEFT VENTRICULAR INTERNAL DIMENSION IN DIASTOLE: 4.5 CM (ref 3.5–6)
LEFT VENTRICULAR POSTERIOR WALL IN END DIASTOLE: 0.9 CM
LEFT VENTRICULAR STROKE VOLUME: 69 ML
LVSV (TEICH): 69 ML
MV E'TISSUE VEL-SEP: 7 CM/S
MV PEAK A VEL: 0.77 M/S
MV PEAK E VEL: 53 CM/S
MV STENOSIS PRESSURE HALF TIME: 48 MS
MV VALVE AREA P 1/2 METHOD: 4.58
RIGHT ATRIUM AREA SYSTOLE A4C: 9.4 CM2
RIGHT VENTRICLE ID DIMENSION: 3.5 CM
SL CV LEFT ATRIUM LENGTH A2C: 3.6 CM
SL CV LV EF: 58
SL CV PED ECHO LEFT VENTRICLE DIASTOLIC VOLUME (MOD BIPLANE) 2D: 92 ML
SL CV PED ECHO LEFT VENTRICLE SYSTOLIC VOLUME (MOD BIPLANE) 2D: 23 ML
TRICUSPID ANNULAR PLANE SYSTOLIC EXCURSION: 2 CM

## 2023-12-17 PROCEDURE — 99239 HOSP IP/OBS DSCHRG MGMT >30: CPT | Performed by: PHYSICIAN ASSISTANT

## 2023-12-17 PROCEDURE — 93306 TTE W/DOPPLER COMPLETE: CPT

## 2023-12-17 PROCEDURE — RECHECK: Performed by: PHYSICIAN ASSISTANT

## 2023-12-17 PROCEDURE — 93306 TTE W/DOPPLER COMPLETE: CPT | Performed by: INTERNAL MEDICINE

## 2023-12-17 RX ORDER — CLOPIDOGREL BISULFATE 75 MG/1
75 TABLET ORAL DAILY
Qty: 21 TABLET | Refills: 0 | Status: SHIPPED | OUTPATIENT
Start: 2023-12-18

## 2023-12-17 RX ORDER — ATORVASTATIN CALCIUM 40 MG/1
40 TABLET, FILM COATED ORAL EVERY EVENING
Qty: 90 TABLET | Refills: 0 | Status: SHIPPED | OUTPATIENT
Start: 2023-12-17

## 2023-12-17 RX ADMIN — ASPIRIN 81 MG: 81 TABLET, COATED ORAL at 08:49

## 2023-12-17 RX ADMIN — ENOXAPARIN SODIUM 40 MG: 40 INJECTION SUBCUTANEOUS at 08:49

## 2023-12-17 RX ADMIN — CLOPIDOGREL BISULFATE 75 MG: 75 TABLET ORAL at 08:49

## 2023-12-17 NOTE — ASSESSMENT & PLAN NOTE
Right sided paresthesia and dysarthria  MRI with evidence of left posterior internal capsule and left lateral thalamus with FLAIR hyperintensity consistent with acute infarct  Symptoms completely resolved since admission  Seen in consult by neurology  Follow-up echocardiogram- to be completed today   Continue aspirin, statin, Plavix- will send scripts at discharge   Follow up with neurology and PCP

## 2023-12-17 NOTE — PLAN OF CARE
Problem: PAIN - ADULT  Goal: Verbalizes/displays adequate comfort level or baseline comfort level  Description: Interventions:  - Encourage patient to monitor pain and request assistance  - Assess pain using appropriate pain scale  - Administer analgesics based on type and severity of pain and evaluate response  - Implement non-pharmacological measures as appropriate and evaluate response  - Consider cultural and social influences on pain and pain management  - Notify physician/advanced practitioner if interventions unsuccessful or patient reports new pain  Outcome: Adequate for Discharge     Problem: INFECTION - ADULT  Goal: Absence or prevention of progression during hospitalization  Description: INTERVENTIONS:  - Assess and monitor for signs and symptoms of infection  - Monitor lab/diagnostic results  - Monitor all insertion sites, i.e. indwelling lines, tubes, and drains  - [place] appropriate cooling/warming therapies per order  - Administer medications as ordered  - Instruct and encourage patient and family to use good hand hygiene technique  Outcome: Adequate for Discharge     Problem: SAFETY ADULT  Goal: Patient will remain free of falls  Description: INTERVENTIONS:  - Educate patient/family on patient safety including physical limitations  - Instruct patient to call for assistance with activity   - Consult OT/PT to assist with strengthening/mobility   - Keep Call bell within reach  - Keep bed low and locked with side rails adjusted as appropriate  - Keep care items and personal belongings within reach  - Initiate and maintain comfort rounds  -  Outcome: Adequate for Discharge  Goal: Maintain or return to baseline ADL function  Description: INTERVENTIONS:  -  Assess patient's ability to carry out ADLs; assess patient's baseline for ADL function and identify physical deficits which impact ability to perform ADLs (bathing, care of mouth/teeth, toileting, grooming, dressing, etc.)  - Assess/evaluate cause  of self-care deficits   - Assess range of motion  - Assess patient's mobility; develop plan if impaired  - Assess patient's need for assistive devices and provide as appropriate  - Encourage maximum independence but intervene and supervise when necessary  - Involve family in performance of ADLs  - Assess for home care needs following discharge   - Consider OT consult to assist with ADL evaluation and planning for discharge  - Provide patient education as appropriate  Outcome: Adequate for Discharge  Goal: Maintains/Returns to pre admission functional level  Description: INTERVENTIONS:  - Perform AM-PAC 6 Click Basic Mobility/ Daily Activity assessment daily.  - Set and communicate daily mobility goal to care team and patient/family/caregiver.   - Collaborate with rehabilitation services on mobility goals if consulted  - Ambulate patient 3 times a day  - Out of bed to chair 3 times a day   - Out of bed for meals 3 times a day  - Out of bed for toileting  - Record patient progress and toleration of activity level   Outcome: Adequate for Discharge     Problem: DISCHARGE PLANNING  Goal: Discharge to home or other facility with appropriate resources  Description: INTERVENTIONS:  - Identify barriers to discharge w/patient and caregiver  - Arrange for needed discharge resources and transportation as appropriate  - Identify discharge learning needs (meds, wound care, etc.)  - Arrange for interpretive services to assist at discharge as - Refer to Case Management Department for coordinating discharge planning if the patient needs post-hospital services based on physician/advanced practitioner order or complex needs related to functional status, cognitive ability, or social support system  Outcome: Completed     Problem: Knowledge Deficit  Goal: Patient/family/caregiver demonstrates understanding of disease process, treatment plan, medications, and discharge instructions  Description: Complete learning assessment and assess  knowledge base.  Interventions:  - Provide teaching at level of understanding  - Provide teaching via preferred learning methods  Outcome: Adequate for Discharge     Problem: Neurological Deficit  Goal: Neurological status is stable or improving  Description: Interventions:  - Monitor and assess patient's level of consciousness, motor function, sensory function, and level of assistance needed for ADLs.   - Monitor and report changes from baseline. Collaborate with interdisciplinary team to initiate plan and implement interventions as ordered.   - Provide and maintain a safe environment.  - Consider seizure precautions.  - Consider fall precautions.  - Consider aspiration precautions.  - Consider bleeding precautions.  Outcome: Adequate for Discharge     Problem: Activity Intolerance/Impaired Mobility  Goal: Mobility/activity is maintained at optimum level for patient  Description: Interventions:  - Assess and monitor patient  barriers to mobility and need for assistive/adaptive devices.  - Assess patient's emotional response to limitations.  - Collaborate with interdisciplinary team and initiate plans and interventions as ordered.  - Encourage independent activity per ability.  - Maintain proper body alignment.  - Perform active/passive rom as tolerated/ordered.  - Plan activities to conserve energy.  - Turn patient as appropriate  Outcome: Adequate for Discharge     Problem: Nutrition  Goal: Nutrition/Hydration status is improving  Description: Monitor and assess patient's nutrition/hydration status for malnutrition (ex- brittle hair, bruises, dry skin, pale skin and conjunctiva, muscle wasting, smooth red tongue, and disorientation). Collaborate with interdisciplinary team and initiate plan and interventions as ordered.  Monitor patient's weight and dietary intake as ordered or per policy. Utilize nutrition screening tool and intervene per policy. Determine patient's food preferences and provide high-protein,  high-caloric foods as appropriate.     - Assist patient with eating.  - Allow adequate time for meals.  - Encourage patient to take dietary supplement as ordered.  - Collaborate with clinical nutritionist.  - Include patient/family/caregiver in decisions related to nutrition.  Outcome: Adequate for Discharge     Problem: Potential for Aspiration  Goal: Non-ventilated patient's risk of aspiration is minimized  Description: Assess and monitor vital signs, respiratory status, and labs (WBC).  Monitor for signs of aspiration (tachypnea, cough, rales, wheezing, cyanosis, fever).    - Assess and monitor patient's ability to swallow.  - Place patient up in chair to eat if possible.  - HOB up at 90 degrees to eat if unable to get patient up into chair.  - Supervise patient during oral intake.   - Instruct patient/ family to take small bites.  - Instruct patient/ family to take small single sips when taking liquids.  - Follow patient-specific strategies generated by speech pathologist.  Outcome: Adequate for Discharge     Problem: Communication Impairment  Goal: Ability to express needs and understand communication  Description: Assess patient's communication skills and ability to understand information.  Patient will demonstrate use of effective communication techniques, alternative methods of communication and understanding even if not able to speak.     - Encourage communication and provide alternate methods of communication as needed.  - Collaborate with case management/ for discharge needs.  - Include patient/family/caregiver in decisions related to communication.  Outcome: Adequate for Discharge

## 2023-12-17 NOTE — PLAN OF CARE
Problem: PAIN - ADULT  Goal: Verbalizes/displays adequate comfort level or baseline comfort level  Description: Interventions:  - Encourage patient to monitor pain and request assistance  - Assess pain using appropriate pain scale  - Administer analgesics based on type and severity of pain and evaluate response  - Implement non-pharmacological measures as appropriate and evaluate response  - Consider cultural and social influences on pain and pain management  - Notify physician/advanced practitioner if interventions unsuccessful or patient reports new pain  Outcome: Progressing     Problem: INFECTION - ADULT  Goal: Absence or prevention of progression during hospitalization  Description: INTERVENTIONS:  - Assess and monitor for signs and symptoms of infection  - Monitor lab/diagnostic results  - Monitor all insertion sites, i.e. indwelling lines, tubes, and drains  - [place] appropriate cooling/warming therapies per order  - Administer medications as ordered  - Instruct and encourage patient and family to use good hand hygiene technique  Outcome: Progressing     Problem: SAFETY ADULT  Goal: Patient will remain free of falls  Description: INTERVENTIONS:  - Educate patient/family on patient safety including physical limitations  - Instruct patient to call for assistance with activity   - Consult OT/PT to assist with strengthening/mobility   - Keep Call bell within reach  - Keep bed low and locked with side rails adjusted as appropriate  - Keep care items and personal belongings within reach  - Initiate and maintain comfort rounds  -  Outcome: Progressing  Goal: Maintain or return to baseline ADL function  Description: INTERVENTIONS:  -  Assess patient's ability to carry out ADLs; assess patient's baseline for ADL function and identify physical deficits which impact ability to perform ADLs (bathing, care of mouth/teeth, toileting, grooming, dressing, etc.)  - Assess/evaluate cause of self-care deficits   - Assess  range of motion  - Assess patient's mobility; develop plan if impaired  - Assess patient's need for assistive devices and provide as appropriate  - Encourage maximum independence but intervene and supervise when necessary  - Involve family in performance of ADLs  - Assess for home care needs following discharge   - Consider OT consult to assist with ADL evaluation and planning for discharge  - Provide patient education as appropriate  Outcome: Progressing  Goal: Maintains/Returns to pre admission functional level  Description: INTERVENTIONS:  - Perform AM-PAC 6 Click Basic Mobility/ Daily Activity assessment daily.  - Set and communicate daily mobility goal to care team and patient/family/caregiver.   - Collaborate with rehabilitation services on mobility goals if consulted  - Ambulate patient 3 times a day  - Out of bed to chair 3 times a day   - Out of bed for meals 3 times a day  - Out of bed for toileting  - Record patient progress and toleration of activity level   Outcome: Progressing     Problem: DISCHARGE PLANNING  Goal: Discharge to home or other facility with appropriate resources  Description: INTERVENTIONS:  - Identify barriers to discharge w/patient and caregiver  - Arrange for needed discharge resources and transportation as appropriate  - Identify discharge learning needs (meds, wound care, etc.)  - Arrange for interpretive services to assist at discharge as - Refer to Case Management Department for coordinating discharge planning if the patient needs post-hospital services based on physician/advanced practitioner order or complex needs related to functional status, cognitive ability, or social support system  Outcome: Progressing     Problem: Knowledge Deficit  Goal: Patient/family/caregiver demonstrates understanding of disease process, treatment plan, medications, and discharge instructions  Description: Complete learning assessment and assess knowledge base.  Interventions:  - Provide teaching at  level of understanding  - Provide teaching via preferred learning methods  Outcome: Progressing     Problem: Neurological Deficit  Goal: Neurological status is stable or improving  Description: Interventions:  - Monitor and assess patient's level of consciousness, motor function, sensory function, and level of assistance needed for ADLs.   - Monitor and report changes from baseline. Collaborate with interdisciplinary team to initiate plan and implement interventions as ordered.   - Provide and maintain a safe environment.  - Consider seizure precautions.  - Consider fall precautions.  - Consider aspiration precautions.  - Consider bleeding precautions.  Outcome: Progressing     Problem: Activity Intolerance/Impaired Mobility  Goal: Mobility/activity is maintained at optimum level for patient  Description: Interventions:  - Assess and monitor patient  barriers to mobility and need for assistive/adaptive devices.  - Assess patient's emotional response to limitations.  - Collaborate with interdisciplinary team and initiate plans and interventions as ordered.  - Encourage independent activity per ability.  - Maintain proper body alignment.  - Perform active/passive rom as tolerated/ordered.  - Plan activities to conserve energy.  - Turn patient as appropriate  Outcome: Progressing     Problem: Communication Impairment  Goal: Ability to express needs and understand communication  Description: Assess patient's communication skills and ability to understand information.  Patient will demonstrate use of effective communication techniques, alternative methods of communication and understanding even if not able to speak.     - Encourage communication and provide alternate methods of communication as needed.  - Collaborate with case management/ for discharge needs.  - Include patient/family/caregiver in decisions related to communication.  Outcome: Progressing     Problem: Potential for Aspiration  Goal:  Non-ventilated patient's risk of aspiration is minimized  Description: Assess and monitor vital signs, respiratory status, and labs (WBC).  Monitor for signs of aspiration (tachypnea, cough, rales, wheezing, cyanosis, fever).    - Assess and monitor patient's ability to swallow.  - Place patient up in chair to eat if possible.  - HOB up at 90 degrees to eat if unable to get patient up into chair.  - Supervise patient during oral intake.   - Instruct patient/ family to take small bites.  - Instruct patient/ family to take small single sips when taking liquids.  - Follow patient-specific strategies generated by speech pathologist.  Outcome: Progressing     Problem: Nutrition  Goal: Nutrition/Hydration status is improving  Description: Monitor and assess patient's nutrition/hydration status for malnutrition (ex- brittle hair, bruises, dry skin, pale skin and conjunctiva, muscle wasting, smooth red tongue, and disorientation). Collaborate with interdisciplinary team and initiate plan and interventions as ordered.  Monitor patient's weight and dietary intake as ordered or per policy. Utilize nutrition screening tool and intervene per policy. Determine patient's food preferences and provide high-protein, high-caloric foods as appropriate.     - Assist patient with eating.  - Allow adequate time for meals.  - Encourage patient to take dietary supplement as ordered.  - Collaborate with clinical nutritionist.  - Include patient/family/caregiver in decisions related to nutrition.  Outcome: Progressing

## 2023-12-17 NOTE — UTILIZATION REVIEW
Initial Clinical Review    Admission: Date/Time/Statement:   Admission Orders (From admission, onward)       Ordered        12/15/23 0957  INPATIENT ADMISSION  Once                          Orders Placed This Encounter   Procedures    INPATIENT ADMISSION     Standing Status:   Standing     Number of Occurrences:   1     Order Specific Question:   Level of Care     Answer:   Med Surg [16]     Order Specific Question:   Estimated length of stay     Answer:   More than 2 Midnights     Order Specific Question:   Certification     Answer:   I certify that inpatient services are medically necessary for this patient for a duration of greater than two midnights. See H&P and MD Progress Notes for additional information about the patient's course of treatment.     ED Arrival Information       Expected   -    Arrival   12/15/2023 09:18    Acuity   Immediate              Means of arrival   Ambulance    Escorted by   [facility]    Service   Hospitalist    Admission type   Emergency              Arrival complaint   stroke alert             Chief Complaint   Patient presents with    STROKE Alert     Arrives via ems pre-hosp alert from work with reported R arm drift and slurred speech. Pt c/o neck pain and light-headedness       Initial Presentation: 58 y.o. male  with no PMH who presents to ED from work via EMS with right sided paresthesia and dysarthria.  He was at his usual state of health when he woke up this morning @0430 with severe left-sided neck pain associated with an unusual sensation of his right arm and leg.  He was able to go to work at [employer] where he was reportedly noted to have slight dysarthria. En route , reported to have R arm drift. With elevated BP of 200/100  . Pt recalls having a similar sensation 2 to 3 weeks ago which eventually resolved on its own.   Stroke alert called . On exam, in ED mild bobbing of right arm but no drift, diminished sensation right arm, normal coordination, NIH 1 .No  further dysarthria . Pt tachycardic. GCS 15 Labs -WBC 10.23. ECG - ST--->NSR.  Initial CT  and CTAshowing no acute findings.  He received aspirin and Plavix in the ED . Pt admitted as Inpatient to telemetry with stroke like symptoms - stroke vs TIA . HTN.  Plan - Stroke workup= telemetry, MRI, echo. PT/OT . Neurology consult. Start Lipitor . Resume ASA, Plavix tomorrow . permissive HTN until MRI .  Neuro consult-  MRI brain notes L thalamic infarct, on prelim read correlating with patient's R sided sensory symptoms. Await radiology read, Appears most likely small vessel in origin . Thrombolytic Decision: Patient not a candidate. Unclear time of onset outside appropriate time window. and Symptoms resolved/clearly non disabling. Plan - -load with DAPT today (aspirin 325, plavix 300 x1) -continue DAPT tomorrow (aspirin 81 mg/plavix 75 mg); anticipate 21 days of DAPT, then monotherapy with aspirin , Start Lipitor 40 mg daily . Permissive HTN x 24-48 hrs . Telemetry. A1c, lipid panel . Echo .   SLP- No dysarthria or language deficits noted or reported. Therefore, formal speech/swallow evaluation not indicated at this time.     Date: 12/16   Day 2:   MRI brain yesterday confirmed L thalamic infarction  . Pt currently asymptomatic with resolution of prior right-sided numbness.  His blood pressures have improved. No events on telemetry . Currently on DAPT and statin, tolerating. Lipid profile notable for decreased HDL, otherwise unremarkable.  A1c is pending. Anticipate echo completion tomorrow.    PT/OT - no post acute rehab needs     Date: 12/17     Day 3: Has surpassed a 2nd midnight with active treatments and services, which include :  CVA workup- Pt had echo today. Does have some left sided neck pain, worse with movement.       ED Triage Vitals   Temperature Pulse Respirations Blood Pressure SpO2   12/15/23 0940 12/15/23 0935 12/15/23 0935 12/15/23 0935 12/15/23 0935   98.2 °F (36.8 °C) 103 18 162/95 97 %      Temp  Source Heart Rate Source Patient Position - Orthostatic VS BP Location FiO2 (%)   12/15/23 0940 12/15/23 0935 12/15/23 0935 12/15/23 0935 --   Oral Monitor Lying Right arm       Pain Score       12/15/23 0935       No Pain          Wt Readings from Last 1 Encounters:   12/17/23 85.7 kg (189 lb)     Additional Vital Signs:   ate/Time Temp Pulse Resp BP MAP (mmHg) SpO2   12/17/23 1149 97.8 °F (36.6 °C) 99 18 135/85 104 96 %   12/17/23 1143 -- 90 -- 142/95 -- --   12/17/23 0700 98.1 °F (36.7 °C) 100 18 142/95 112 97 %   12/17/23 0300 97.8 °F (36.6 °C) 90 18 142/87 109 97 %   12/16/23 2234 98.6 °F (37 °C) 95 18 137/82 104 96 %   12/16/23 1937 97.6 °F (36.4 °C) 98 18 135/91 107 96 %   12/16/23 1930 -- -- -- -- -- --   12/16/23 1511 98.4 °F (36.9 °C) 96 18 139/78 101 97 %   12/16/23 1107 97.9 °F (36.6 °C) 99 18 127/86 101 96 %   12/16/23 0758 97.6 °F (36.4 °C) 101 18 139/91 111 95 %   12/16/23 0238 98.1 °F (36.7 °C) 87 18 141/86 109 96 %   12/16/23 0130 97.1 °F (36.2 °C) Abnormal  93 18 126/77 96 96 %   12/15/23 2330 -- 91 18 136/82 102 94 %   12/15/23 2130 98.6 °F (37 °C) 92 18 144/96 108 96 %   12/15/23 1730 98.1 °F (36.7 °C) 100 18 128/73 94 96 %   12/15/23 1630 98.1 °F (36.7 °C) 98 18 140/80 -- 95 %   12/15/23 1518 97.3 °F (36.3 °C) Abnormal  90 18 138/80 104 96 %   12/15/23 1430 -- -- -- -- -- --   12/15/23 1426 97.6 °F (36.4 °C) 96 18 155/85 113 98 %   12/15/23 1230 -- 98 17 158/91 119 97 %   12/15/23 1130 -- 92 16 156/101 Abnormal  123 96 %   12/15/23 1030 -- 99 17 144/86 109 96 %   12/15/23 1015 98.2 °F (36.8 °C) 100 16 145/88 109 95 %   12/15/23 1000 -- 98 16 139/80 103 96 %   12/15/23 0945 -- 103 17 159/94 -- 96 %   12/15/23 0940 98.2 °F (36.8 °C) 101 17 -- -- 97 %     Date and Time Eye Opening Best Verbal Response Best Motor Response Coleen Coma Scale Score   12/17/23 0330 4 5 6 15   12/16/23 2330 4 5 6 15   12/16/23 1930 4 5 6 15   12/16/23 1600 4 5 6 15   12/16/23 1200 4 5 6 15   12/16/23 0800 4 5 6 15    12/15/23 2130 4 5 6 15   12/15/23 1430 4 5 6 15   12/15/23 1330 4 5 6 15   12/15/23 1230 4 5 6 15   12/15/23 1151 4 5 6 15   12/15/23 1130 4 5 6 15   12/15/23 1030 4 5 6 15   12/15/23 1015 4 5 6 15   12/15/23 1000 4 5 6 15   12/15/23 0945 4 5 6 15   12/15/23 0935 4 5 6 15         Pertinent Labs/Diagnostic Test Results:    12/15 ECG- Sinus tachycardia    12/15 ECG #2 - NSR  12/17 ECHO- in process.       MRI brain wo contrast   Final Result by Sandra Valencia MD (12/15 1546)   Addendum (preliminary) 1 of 1 by Sandra Valencia MD (12/15 1546)   ADDENDUM:      Upon further review of the images there is mild restricted diffusion    involving left posterior internal capsule and left lateral thalamus with    FLAIR hyperintensity consistent with acute infarct.               Findings discussed via [place] text with Dr. Jaspal Williamson at 3:40 p.m.      Final      1.  No acute ischemia. No significant white matter change.   2.  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 There is no intrinsic susceptibility signal or T1 hyperintensity to suggest mineralization or blood products in a complex pineal cyst. Recommend further characterization with    contrast-enhanced brain MRI.            This study was marked in EPIC for notification and follow-up.               Workstation performed: [id number]         CTA stroke alert (head/neck)   Final Result by Kel Canales DO (12/15 0941)      No high-grade stenosis or occlusive disease of the cervical or intracranial vasculature. Minor atherosclerotic change of both carotid bifurcations.      Findings were directly discussed with Jaspal Jha at 9:37 a.m.                           Workstation performed: [id number]         CT stroke alert brain   Final Result by Kel Canales DO (12/15 0940)      No acute intracranial abnormality.      Findings were directly discussed with Jaspal Jha at 9:37 a.m.      Workstation performed: [id number]                Results from last 7 days   Lab Units 12/15/23  0923   WBC Thousand/uL 10.23*   HEMOGLOBIN g/dL 15.7   HEMATOCRIT % 47.4   PLATELETS Thousands/uL 339   NEUTROS ABS Thousands/µL 6.67         Results from last 7 days   Lab Units 12/15/23  0923   SODIUM mmol/L 137   POTASSIUM mmol/L 3.8   CHLORIDE mmol/L 101   CO2 mmol/L 29   ANION GAP mmol/L 7   BUN mg/dL 17   CREATININE mg/dL 1.01   EGFR ml/min/1.73sq m 82   CALCIUM mg/dL 9.9     Results from last 7 days   Lab Units 12/15/23  0923   AST U/L 16   ALT U/L 16   ALK PHOS U/L 142*   TOTAL PROTEIN g/dL 7.7   ALBUMIN g/dL 4.4   TOTAL BILIRUBIN mg/dL 0.49     Results from last 7 days   Lab Units 12/15/23  0933   POC GLUCOSE mg/dl 93     Results from last 7 days   Lab Units 12/15/23  0923   GLUCOSE RANDOM mg/dL 97         Results from last 7 days   Lab Units 12/16/23  0511   HEMOGLOBIN A1C % 5.7*   EAG mg/dl 117           Results from last 7 days   Lab Units 12/15/23  1130 12/15/23  0923   HS TNI 0HR ng/L  --  <2   HS TNI 2HR ng/L <2  --          Results from last 7 days   Lab Units 12/15/23  0923   PROTIME seconds 12.8   INR  0.94   PTT seconds 29      Latest Reference Range & Units 12/16/23 05:11   Cholesterol See Comment mg/dL 142   Triglycerides See Comment mg/dL 114   HDL >=40 mg/dL 31 (L)   LDL Calculated 0 - 100 mg/dL 88   (L): Data is abnormally low              ED Treatment:   Medication Administration from 12/15/2023 0918 to 12/15/2023 1422         Date/Time Order Dose Route Action     12/15/2023 0924 EST iohexol (OMNIPAQUE) 350 MG/ML injection (SINGLE-DOSE) 85 mL 85 mL Intravenous Given     12/15/2023 1002 EST aspirin tablet 325 mg 325 mg Oral Given     12/15/2023 1002 EST clopidogrel (PLAVIX) tablet 300 mg 300 mg Oral Given     12/15/2023 1231 EST enoxaparin (LOVENOX) subcutaneous injection 40 mg 40 mg Subcutaneous Given          Past Medical History:   Diagnosis Date    Chronic back pain     COVID 09/2021    Essential hypertension     Kidney stone      Lumbar herniated disc     Pneumonia     as child    Sciatica     Seasonal allergies      Present on Admission:   Elevated BP without diagnosis of hypertension      Admitting Diagnosis: Stroke (cerebrum) (HCC) [I63.9]  Stroke-like symptoms [R29.90]  Age/Sex: 58 y.o. male  Admission Orders:  Scheduled Medications:  aspirin, 81 mg, Oral, Daily  atorvastatin, 40 mg, Oral, QPM  clopidogrel, 75 mg, Oral, Daily  enoxaparin, 40 mg, Subcutaneous, Daily      Continuous IV Infusions:     PRN Meds:      Cardiovasc diet   Telemetry   SCD   Neuro check   OOB as narciso w/ assist      IP CONSULT TO CASE MANAGEMENT  IP CONSULT TO NUTRITION SERVICES    Network Utilization Review Department  ATTENTION: Please call with any questions or concerns to [phone] and carefully listen to the prompts so that you are directed to the right person. All voicemails are confidential.   For Discharge needs, contact Care Management DC Support Team at [phone] opt. [phone]  Send all requests for admission clinical reviews, approved or denied determinations and any other requests to dedicated fax number below belonging to the [facility] where the patient is receiving treatment. List of dedicated fax numbers for the Facilities:  FACILITY NAME UR FAX NUMBER   ADMISSION DENIALS (Administrative/Medical Necessity) [phone]   DISCHARGE SUPPORT TEAM (NETWORK) [phone]   PARENT CHILD HEALTH (Maternity/NICU/Pediatrics) [phone]   [facility] [phone]   [facility] [phone]   [facility] [phone]   [facility] [phone]   [facility] [phone]   [facility] [phone]   [facility] [phone]   [facility] [phone]   [facility] [phone]   [facility]  [phone]   General acute hospital [phone]

## 2023-12-17 NOTE — PROGRESS NOTES
[facility]  Progress Note  Name: Roberto Emerson I  MRN: [id number]  Unit/Bed#: E4 -01 I Date of Admission: 12/15/2023   Date of Service: 12/17/2023 I Hospital Day: 2    Assessment/Plan   * Acute CVA (cerebrovascular accident) (HCC)  Assessment & Plan  Right sided paresthesia and dysarthria  MRI with evidence of left posterior internal capsule and left lateral thalamus with FLAIR hyperintensity consistent with acute infarct  Symptoms completely resolved since admission  Seen in consult by neurology  Follow-up echocardiogram- to be completed today   Continue aspirin, statin, Plavix- will send scripts at discharge   Follow up with neurology and PCP     Elevated BP without diagnosis of hypertension  Assessment & Plan  Not on BP meds prior to admission  Could be reactive due to stroke   BP has remained stable today, continue monitoring while inpatient              VTE Pharmacologic Prophylaxis: VTE Score: 6 High Risk (Score >/= 5) - Pharmacological DVT Prophylaxis Ordered: enoxaparin (Lovenox). Sequential Compression Devices Ordered.    Mobility:   Basic Mobility Inpatient Raw Score: 23  JH-HLM Goal: 7: Walk 25 feet or more  JH-HLM Achieved: 7: Walk 25 feet or more  HLM Goal achieved. Continue to encourage appropriate mobility.    Patient Centered Rounds: I performed bedside rounds with nursing staff today.   Discussions with Specialists or Other Care Team Provider: none    Education and Discussions with Family / Patient:  patient.     Total Time Spent on Date of Encounter in care of patient:  This time was spent on one or more of the following: performing physical exam; counseling and coordination of care; obtaining or reviewing history; documenting in the medical record; reviewing/ordering tests, medications or procedures; communicating with other healthcare professionals and discussing with patient's family/caregivers.    Current Length of Stay: 2 day(s)  Current Patient Status:  Inpatient   Certification Statement: The patient will continue to require additional inpatient hospital stay due to CVA pending echo  Discharge Plan: Anticipate discharge later today or tomorrow to home.    Code Status: Level 1 - Full Code    Subjective:   Patient seen and examined at bedside. Notes he is doing well today. Denies any symptoms of numbness or tingling. Does have some left sided neck pain, worse with movement.     Objective:     Vitals:   Temp (24hrs), Av.1 °F (36.7 °C), Min:97.6 °F (36.4 °C), Max:98.6 °F (37 °C)    Temp:  [97.6 °F (36.4 °C)-98.6 °F (37 °C)] 98.1 °F (36.7 °C)  HR:  [] 100  Resp:  [18] 18  BP: (127-142)/(78-95) 142/95  SpO2:  [96 %-97 %] 97 %  Body mass index is 24.43 kg/m².     Input and Output Summary (last 24 hours):     Intake/Output Summary (Last 24 hours) at 2023 1030  Last data filed at 2023 0616  Gross per 24 hour   Intake 840 ml   Output --   Net 840 ml       Physical Exam:   Physical Exam  Vitals reviewed.   Constitutional:       General: He is not in acute distress.  HENT:      Head: Normocephalic and atraumatic.   Eyes:      General: No scleral icterus.     Conjunctiva/sclera: Conjunctivae normal.   Cardiovascular:      Rate and Rhythm: Normal rate and regular rhythm.      Heart sounds: No murmur heard.  Pulmonary:      Effort: Pulmonary effort is normal.      Breath sounds: Normal breath sounds.   Abdominal:      General: Bowel sounds are normal. There is no distension.      Palpations: Abdomen is soft.      Tenderness: There is no abdominal tenderness.   Musculoskeletal:      Cervical back: Neck supple.      Right lower leg: No edema.      Left lower leg: No edema.   Skin:     General: Skin is warm and dry.   Neurological:      Mental Status: He is alert and oriented to person, place, and time.   Psychiatric:         Mood and Affect: Mood normal.         Behavior: Behavior normal.          Additional Data:     Labs:  Results from last 7 days   Lab  Units 12/15/23  0923   WBC Thousand/uL 10.23*   HEMOGLOBIN g/dL 15.7   HEMATOCRIT % 47.4   PLATELETS Thousands/uL 339   NEUTROS PCT % 65   LYMPHS PCT % 20   MONOS PCT % 8   EOS PCT % 6     Results from last 7 days   Lab Units 12/15/23  0923   SODIUM mmol/L 137   POTASSIUM mmol/L 3.8   CHLORIDE mmol/L 101   CO2 mmol/L 29   BUN mg/dL 17   CREATININE mg/dL 1.01   ANION GAP mmol/L 7   CALCIUM mg/dL 9.9   ALBUMIN g/dL 4.4   TOTAL BILIRUBIN mg/dL 0.49   ALK PHOS U/L 142*   ALT U/L 16   AST U/L 16   GLUCOSE RANDOM mg/dL 97     Results from last 7 days   Lab Units 12/15/23  0923   INR  0.94     Results from last 7 days   Lab Units 12/15/23  0933   POC GLUCOSE mg/dl 93     Results from last 7 days   Lab Units 12/16/23  0511   HEMOGLOBIN A1C % 5.7*           Lines/Drains:  Invasive Devices       Peripheral Intravenous Line  Duration             Peripheral IV 12/15/23 Distal;Left;Upper;Ventral (anterior) Arm 2 days                      Telemetry:  Telemetry Orders (From admission, onward)               24 Hour Telemetry Monitoring  Continuous x 24 Hours (Telem)        Question:  Reason for 24 Hour Telemetry  Answer:  TIA/Suspected CVA/ Confirmed CVA                     Telemetry Reviewed: Normal Sinus Rhythm  Indication for Continued Telemetry Use: No indication for continued use. Will discontinue.              Imaging: No pertinent imaging reviewed.    Recent Cultures (last 7 days):         Last 24 Hours Medication List:   Current Facility-Administered Medications   Medication Dose Route Frequency Provider Last Rate    aspirin  81 mg Oral Daily Travon Pichardo MD      atorvastatin  40 mg Oral QPM Travon Pichardo MD      clopidogrel  75 mg Oral Daily Travon Pichardo MD      enoxaparin  40 mg Subcutaneous Daily Travon Pichardo MD          Today, Patient Was Seen By: Cathy Monique PA-C    **Please Note: This note may have been constructed using a voice recognition  system.**

## 2023-12-17 NOTE — DISCHARGE SUMMARY
[facility]  Discharge- Roberto Emerson 1965, 58 y.o. male MRN: [id number]  Unit/Bed#: E4 -01 Encounter: [id number]  Primary Care Provider: Fouzia Canales DO   Date and time admitted to hospital: 12/15/2023  9:18 AM    * Acute CVA (cerebrovascular accident) (HCC)  Assessment & Plan  Right sided paresthesia and dysarthria  MRI with evidence of left posterior internal capsule and left lateral thalamus with FLAIR hyperintensity consistent with acute infarct  Symptoms completely resolved since admission  Seen in consult by neurology  Echo completed while inpatient   Continue aspirin, statin, Plavix- will send scripts at discharge   Follow up with neurology and PCP     Elevated BP without diagnosis of hypertension  Assessment & Plan  Not on BP meds prior to admission  Could be reactive due to stroke   BP has remained stable today, continue monitoring while inpatient       Medical Problems       Resolved Problems  Date Reviewed: 12/17/2023   None       Discharging Physician / Practitioner: Cathy Monique PA-C  PCP: Fouzia Canales DO  Admission Date:   Admission Orders (From admission, onward)       Ordered        12/15/23 0957  INPATIENT ADMISSION  Once                          Discharge Date: 12/17/23    Consultations During Hospital Stay:  Neurology     Procedures Performed:   MRI brain wo contrast    Addendum Date: 12/15/2023    ADDENDUM: Upon further review of the images there is mild restricted diffusion involving left posterior internal capsule and left lateral thalamus with FLAIR hyperintensity consistent with acute infarct.    Result Date: 12/15/2023  Impression: 1.  No acute ischemia. No significant white matter change. 2.  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 There is no intrinsic susceptibility signal or T1 hyperintensity to suggest mineralization or blood products in a complex pineal cyst. Recommend further characterization with   contrast-enhanced brain MRI.    CTA stroke alert (head/neck)    Result Date: 12/15/2023  Impression: No high-grade stenosis or occlusive disease of the cervical or intracranial vasculature. Minor atherosclerotic change of both carotid bifurcations.    CT stroke alert brain    Result Date: 12/15/2023  Impression: No acute intracranial abnormality.       Significant Findings / Test Results:   See above    Incidental Findings:   See above       Test Results Pending at Discharge (will require follow up):   none     Outpatient Tests Requested:  PCP follow up     Complications:  none    Reason for Admission: stroke     Hospital Course:   Roberto Emerson is a 58 y.o. male patient who originally presented to the hospital on 12/15/2023 due to stroke like symptoms, atypical right-sided extremity sensation. He was a stroke alert in the ED and was admitted on stroke pathway. He did have evidence of L thalamic infarct and was started on ASA, Plavix and statin. He had complete resolution in his symptoms while inpatient and was discharged with outpatient follow up with PCP and neurology. He did have elevated BP at admission but improved without intervention and was not started on any BP medications.       Please see above list of diagnoses and related plan for additional information.     Condition at Discharge: stable    Discharge Day Visit / Exam:   * Please refer to separate progress note for these details *    Discharge instructions/Information to patient and family:   See after visit summary for information provided to patient and family.      Provisions for Follow-Up Care:  See after visit summary for information related to follow-up care and any pertinent home health orders.      Mobility at time of Discharge:   Basic Mobility Inpatient Raw Score: 23  JH-HLM Goal: 7: Walk 25 feet or more  JH-HLM Achieved: 7: Walk 25 feet or more  HLM Goal achieved. Continue to encourage appropriate mobility.     Disposition:   Home    Planned  Readmission: none     Discharge Statement:  I spent 35 minutes discharging the patient. This time was spent on the day of discharge. I had direct contact with the patient on the day of discharge. Greater than 50% of the total time was spent examining patient, answering all patient questions, arranging and discussing plan of care with patient as well as directly providing post-discharge instructions.  Additional time then spent on discharge activities.    Discharge Medications:  See after visit summary for reconciled discharge medications provided to patient and/or family.      **Please Note: This note may have been constructed using a voice recognition system**

## 2023-12-17 NOTE — ASSESSMENT & PLAN NOTE
Not on BP meds prior to admission  Could be reactive due to stroke   BP has remained stable today, continue monitoring while inpatient

## 2023-12-17 NOTE — ASSESSMENT & PLAN NOTE
Right sided paresthesia and dysarthria  MRI with evidence of left posterior internal capsule and left lateral thalamus with FLAIR hyperintensity consistent with acute infarct  Symptoms completely resolved since admission  Seen in consult by neurology  Echo completed while inpatient   Continue aspirin, statin, Plavix- will send scripts at discharge   Follow up with neurology and PCP

## 2023-12-18 NOTE — UTILIZATION REVIEW
NOTIFICATION OF ADMISSION DISCHARGE   This is a Notification of Discharge from [facility]. Please be advised that this patient has been discharge from our facility. Below you will find the admission and discharge date and time including the patient’s disposition.   UTILIZATION REVIEW CONTACT:  Beth Dietrich MA  Utilization   Network Utilization Review Department  Phone: [phone] x carefully listen to the prompts. All voicemails are confidential.  Email: [email or web address]     ADMISSION INFORMATION  PRESENTATION DATE: 12/15/2023  9:18 AM  OBERVATION ADMISSION DATE:   INPATIENT ADMISSION DATE: 12/15/23  9:57 AM   DISCHARGE DATE: 12/17/2023  4:10 PM   DISPOSITION:Home/Self Care    Network Utilization Review Department  ATTENTION: Please call with any questions or concerns to [phone] and carefully listen to the prompts so that you are directed to the right person. All voicemails are confidential.   For Discharge needs, contact Care Management DC Support Team at [phone] opt. 2  Send all requests for admission clinical reviews, approved or denied determinations and any other requests to dedicated fax number below belonging to the campus where the patient is receiving treatment. List of dedicated fax numbers for the Facilities:  FACILITY NAME UR FAX NUMBER   ADMISSION DENIALS (Administrative/Medical Necessity) [phone]   DISCHARGE SUPPORT TEAM ([facility]) [phone]   PARENT CHILD HEALTH (Maternity/NICU/Pediatrics) [phone]   General acute hospital [phone]   [facility] [phone]   [facility] [phone]   [facility] [phone]   [facility] [phone]   [facility] [phone]   [facility] [phone]   [facility]  [phone]   [facility] [phone]   [facility] [phone]   [facility] [phone]

## 2023-12-19 ENCOUNTER — TELEPHONE (OUTPATIENT)
Dept: FAMILY MEDICINE CLINIC | Facility: CLINIC | Age: 58
End: 2023-12-19

## 2023-12-19 ENCOUNTER — TRANSITIONAL CARE MANAGEMENT (OUTPATIENT)
Dept: FAMILY MEDICINE CLINIC | Facility: CLINIC | Age: 58
End: 2023-12-19

## 2023-12-22 ENCOUNTER — OFFICE VISIT (OUTPATIENT)
Dept: FAMILY MEDICINE CLINIC | Facility: CLINIC | Age: 58
End: 2023-12-22
Payer: COMMERCIAL

## 2023-12-22 VITALS
BODY MASS INDEX: 24.25 KG/M2 | WEIGHT: 183 LBS | OXYGEN SATURATION: 97 % | DIASTOLIC BLOOD PRESSURE: 78 MMHG | SYSTOLIC BLOOD PRESSURE: 120 MMHG | HEIGHT: 73 IN | HEART RATE: 98 BPM | TEMPERATURE: 97.5 F

## 2023-12-22 DIAGNOSIS — M54.2 CERVICAL PAIN (NECK): ICD-10-CM

## 2023-12-22 DIAGNOSIS — Z12.11 SCREEN FOR COLON CANCER: ICD-10-CM

## 2023-12-22 DIAGNOSIS — R73.03 PRE-DIABETES: ICD-10-CM

## 2023-12-22 DIAGNOSIS — D22.9 MULTIPLE NEVI: ICD-10-CM

## 2023-12-22 DIAGNOSIS — E04.9 ENLARGED THYROID: ICD-10-CM

## 2023-12-22 DIAGNOSIS — Z11.59 NEED FOR HEPATITIS C SCREENING TEST: ICD-10-CM

## 2023-12-22 DIAGNOSIS — Z11.4 SCREENING FOR HIV (HUMAN IMMUNODEFICIENCY VIRUS): ICD-10-CM

## 2023-12-22 DIAGNOSIS — Z83.49 FAMILY HISTORY OF THYROID DISEASE: ICD-10-CM

## 2023-12-22 DIAGNOSIS — I63.9 CEREBROVASCULAR ACCIDENT (CVA), UNSPECIFIED MECHANISM (HCC): Primary | ICD-10-CM

## 2023-12-22 PROCEDURE — 99495 TRANSJ CARE MGMT MOD F2F 14D: CPT | Performed by: NURSE PRACTITIONER

## 2023-12-22 NOTE — PROGRESS NOTES
[facility]    ASSESSMENT AND PLAN     1. Cerebrovascular accident (CVA), unspecified mechanism (HCC)  Assessment & Plan:  Recent hospitalization and discharge instructions reviewed  No neurologic deficits  Physical exam unremarkable today, with exception of ongoing neck pain  Appears muscular-discussed supportive care  Referral to PT  Recheck lab work-orders in  Reports compliance with aspirin, statin and Plavix  Advise follow-up with neurology-referral placed  Follow-up here 3 months  Strict ER precautions with any acute change prior to specialist follow-up  Patient agreeable with above plan    Orders:  -     CBC and differential; Future  -     Lipid panel; Future  -     VAS carotid complete study; Future; Expected date: 12/22/2023  -     Ambulatory Referral to Neurology; Future    2. Pre-diabetes  -     Comprehensive metabolic panel; Future  -     Hemoglobin A1C; Future; Expected date: 03/22/2024    3. Screening for HIV (human immunodeficiency virus)  -     HIV 1/2 AG/AB W REFLEX [facility] and [facility] only; Future    4. Need for hepatitis C screening test  -     Hepatitis C RNA, Quantitative PCR, [facility]; Future    5. Screen for colon cancer  -     Ambulatory Referral to Gastroenterology; Future    6. Enlarged thyroid  -     US thyroid; Future; Expected date: 12/22/2023    7. Family history of thyroid disease  -     US thyroid; Future; Expected date: 12/22/2023    8. Multiple nevi  -     Ambulatory Referral to Dermatology; Future    9. Cervical pain (neck)  -     Ambulatory Referral to Physical Therapy; Future           SUBJECTIVE       Patient ID: Roberto Emerson is a 58 y.o. male.    Chief Complaint   Patient presents with    Transition of Care Management       HISTORY OF PRESENT ILLNESS    Patient presents today for hospital follow-up   Recent hospitalization: Diagnosis CVA   Reports his symptoms started while at work-developed sudden onset of slurred speech and poor equilibrium.  Noted a headache and  elevated blood pressure. was transferred to the hospital via EMS.  Reports he feels well since discharge,, and is back to his baseline with no residual deficit.  He is back at work without restrictions.  He is to follow-up with neurology-but has not made appointment as of yet.  He has no complaints, other than some ongoing left-sided neck pain.      TCM Call     Date and time call was made  12/19/2023  8:20 AM    Hospital care reviewed  Records reviewed    Patient was hospitialized at  [facility]    Date of Admission  12/15/23    Date of discharge  12/17/23    Diagnosis  CVA    Disposition  Home    Were the patients medications reviewed and updated  Yes    Current Symptoms  None      TCM Call     Post hospital issues  None    Should patient be enrolled in anticoag monitoring?  No    Scheduled for follow up?  Yes    Did you obtain your prescribed medications  Yes    Do you need help managing your prescriptions or medications  No    Is transportation to your appointment needed  No    I have advised the patient to call PCP with any new or worsening symptoms    Celsa Snowden MA    Living Arrangements  Spouse or Significiant other    Are you recieving any outpatient services  No    Are you recieving home care services  No    Are you using any community resources  No    Current waiver services  No    Have you fallen in the last 12 months  No    Interperter language line needed  No     D/C reviewed:  * Acute CVA (cerebrovascular accident) (HCC)  Assessment & Plan  · Right sided paresthesia and dysarthria  · MRI with evidence of left posterior internal capsule and left lateral thalamus with FLAIR hyperintensity consistent with acute infarct  · Symptoms completely resolved since admission  · Seen in consult by neurology  · Echo completed while inpatient   · Continue aspirin, statin, Plavix- will send scripts at discharge   · Follow up with neurology and PCP      Elevated BP without diagnosis of  "hypertension  Assessment & Plan  · Not on BP meds prior to admission  · Could be reactive due to stroke   · BP has remained stable today, continue monitoring while inpatient                  The following portions of the patient's history were reviewed and updated as appropriate: allergies, current medications, past family history, past medical history, past social history, past surgical history, and problem list.    REVIEW OF SYSTEMS  Review of Systems   Constitutional: Negative.    HENT: Negative.     Eyes: Negative.    Respiratory: Negative.     Cardiovascular: Negative.    Gastrointestinal: Negative.    Genitourinary: Negative.    Musculoskeletal:  Positive for neck pain (on/off - worse in am). Negative for arthralgias, back pain, gait problem, myalgias and neck stiffness.   Skin: Negative.    Neurological: Negative.    Psychiatric/Behavioral: Negative.         OBJECTIVE      VITAL SIGNS  /78 (BP Location: Left arm, Patient Position: Sitting, Cuff Size: Adult)   Pulse 98   Temp 97.5 °F (36.4 °C)   Ht 6' 1\" (1.854 m)   Wt 83 kg (183 lb)   SpO2 97%   BMI 24.14 kg/m²     CURRENT MEDICATIONS    Current Outpatient Medications:     aspirin (ECOTRIN LOW STRENGTH) 81 mg EC tablet, Take 1 tablet (81 mg total) by mouth daily, Disp: 90 tablet, Rfl: 0    atorvastatin (LIPITOR) 40 mg tablet, Take 1 tablet (40 mg total) by mouth every evening, Disp: 90 tablet, Rfl: 0    clopidogrel (PLAVIX) 75 mg tablet, Take 1 tablet (75 mg total) by mouth daily, Disp: 21 tablet, Rfl: 0    sildenafil (VIAGRA) 50 MG tablet, Take 1 tablet by mouth on empty stomach one hour prior to sexual activity (Patient not taking: Reported on 12/22/2023), Disp: 30 tablet, Rfl: 1      PHYSICAL EXAMINATION   Physical Exam  Vitals and nursing note reviewed.   Constitutional:       General: He is not in acute distress.     Appearance: Normal appearance. He is well-developed and well-groomed. He is not ill-appearing.   HENT:      Head: Normocephalic " and atraumatic.      Right Ear: Tympanic membrane, ear canal and external ear normal.      Left Ear: Tympanic membrane, ear canal and external ear normal.      Mouth/Throat:      Mouth: Mucous membranes are moist.   Eyes:      Pupils: Pupils are equal, round, and reactive to light.   Neck:      Vascular: No carotid bruit.   Cardiovascular:      Rate and Rhythm: Normal rate and regular rhythm.      Pulses:           Posterior tibial pulses are 2+ on the right side and 2+ on the left side.      Heart sounds: Normal heart sounds.   Pulmonary:      Effort: Pulmonary effort is normal. No respiratory distress.      Breath sounds: Normal breath sounds and air entry.   Musculoskeletal:      Cervical back: Normal range of motion. Tenderness present. No rigidity.      Right lower leg: No edema.      Left lower leg: No edema.   Lymphadenopathy:      Cervical: No cervical adenopathy.   Skin:     General: Skin is warm and dry.   Neurological:      General: No focal deficit present.      Mental Status: He is alert and oriented to person, place, and time.      Cranial Nerves: Cranial nerves 2-12 are intact.      Motor: Motor function is intact.      Gait: Gait is intact.   Psychiatric:         Attention and Perception: Attention normal.         Mood and Affect: Mood normal.         Speech: Speech normal.         Behavior: Behavior normal.         Thought Content: Thought content normal.         Cognition and Memory: Cognition normal.         Judgment: Judgment normal.

## 2023-12-30 PROBLEM — I63.9 CEREBROVASCULAR ACCIDENT (CVA) (HCC): Status: ACTIVE | Noted: 2023-12-30

## 2023-12-30 NOTE — ASSESSMENT & PLAN NOTE
Recent hospitalization and discharge instructions reviewed  No neurologic deficits  Physical exam unremarkable today, with exception of ongoing neck pain  Appears muscular-discussed supportive care  Referral to PT  Recheck lab work-orders in  Reports compliance with aspirin, statin and Plavix  Advise follow-up with neurology-referral placed  Follow-up here 3 months  Strict ER precautions with any acute change prior to specialist follow-up  Patient agreeable with above plan

## 2024-01-23 ENCOUNTER — TELEPHONE (OUTPATIENT)
Dept: NEUROLOGY | Facility: CLINIC | Age: 59
End: 2024-01-23

## 2024-01-23 NOTE — TELEPHONE ENCOUNTER
1ST ATTEMPT,     Called pt no answer, LMOM.    Thank you,     Komal      HFU/ YASHIRA ALL/ ACUTE CVA    DC- HOME- 12/17/2023.    Roberto Idania will need follow up in in 6 weeks with neurovascular attending or advance practitioner. He will not require outpatient neurological testing.

## 2024-03-01 ENCOUNTER — TELEPHONE (OUTPATIENT)
Age: 59
End: 2024-03-01

## 2024-03-04 ENCOUNTER — TELEPHONE (OUTPATIENT)
Dept: NEUROLOGY | Facility: CLINIC | Age: 59
End: 2024-03-04

## 2024-03-06 ENCOUNTER — TELEPHONE (OUTPATIENT)
Dept: GASTROENTEROLOGY | Facility: CLINIC | Age: 59
End: 2024-03-06

## 2024-03-06 ENCOUNTER — OFFICE VISIT (OUTPATIENT)
Dept: GASTROENTEROLOGY | Facility: CLINIC | Age: 59
End: 2024-03-06
Payer: COMMERCIAL

## 2024-03-06 VITALS
SYSTOLIC BLOOD PRESSURE: 138 MMHG | HEIGHT: 73 IN | TEMPERATURE: 97.9 F | OXYGEN SATURATION: 97 % | DIASTOLIC BLOOD PRESSURE: 77 MMHG | WEIGHT: 181.4 LBS | BODY MASS INDEX: 24.04 KG/M2 | HEART RATE: 86 BPM

## 2024-03-06 DIAGNOSIS — Z86.73 HISTORY OF CVA (CEREBROVASCULAR ACCIDENT): ICD-10-CM

## 2024-03-06 DIAGNOSIS — R12 HEARTBURN: Primary | ICD-10-CM

## 2024-03-06 DIAGNOSIS — Z12.11 SCREEN FOR COLON CANCER: ICD-10-CM

## 2024-03-06 PROCEDURE — 99244 OFF/OP CNSLTJ NEW/EST MOD 40: CPT | Performed by: DIETITIAN, REGISTERED

## 2024-03-06 NOTE — PROGRESS NOTES
[facility] - Outpatient Consultation New Patient  Roberto Emerson 58 y.o. male MRN: [id number]  Encounter: [id number]          ASSESSMENT AND PLAN:    1.  Heartburn  Patient reports very mild/rare heartburn, maybe once a month.  He denies any dysphagia, nausea, vomiting, chronic abdominal pain/bloating, black stools.    -Discussed with patient that rare heartburn can be physiologic and is not cause for concern, when not accompanied by any alarm symptoms.  -Recommend over-the-counter famotidine (Pepcid) as needed.  -Advised patient to call the office if heartburn symptoms ever worsen in severity or frequency.      2.  Screening for colon cancer  Patient denies any dysphagia, nausea, vomiting, abdominal pain, bloating, changes in bowel habits, constipation, diarrhea, black or bloody bowel movements, rectal bleeding, unintentional weight loss.  He had 1 instance of rectal bleeding about 2 years ago, which lasted 1 day, and has never recurred.  He has 2 BMs per day on average.  He denies any family history of colon cancer or colon polyps.    -Schedule colonoscopy. Discussed bowel preparation, what to expect in procedure, and risks/benefits, including risk of bleeding, infection, and bowel perforation. Patient understands and agrees with plan for procedure. No further questions/concerns at present.       3.  History of CVA  Reviewed hospitalization in 12/2023 when patient presented to the ED with atypical right-sided extremity sensation.  He had evidence of left thalamic infarct and was started on DAPT and statin.  Per neurology notes at that time, DAPT was to be continued for about 3 weeks followed by transition to aspirin therapy.  Patient reports he completed DAPT therapy after about a month, and currently remains on aspirin.  Has not yet seen neurology as an outpatient since that hospitalization.    -Recommend neurology evaluation prior to arranging colonoscopy, given recent CVA in last 3  months, and to ensure no need for anticoagulation at present.  -Patient is currently scheduled to see neurology on 3/20/2024.  We will be sure to schedule his colonoscopy after this visit so that we can get neurology clearance.      Follow up as needed.    ________________________________________________________    HPI:  Roberto Emerson is a 58-year-old male with history of acute CVA in 12/2023 currently on DAPT who presents for screening for colon cancer.  Reviewed hospitalization in 12/2023 when patient presented to the ED with atypical right-sided extremity sensation.  He had evidence of left thalamic infarct and was started on DAPT and statin.  Per neurology notes at that time, DAPT was to be continued for about 3 weeks followed by transition to aspirin therapy.  Patient reports he completed DAPT therapy after about a month, and currently remains on aspirin.  Has not yet seen neurology as an outpatient since that hospitalization.      Patient feels well overall.  He experiences heartburn very rarely, maybe once a month.  Has never used any over-the-counter medicine to treat this as it is very mild and short-lived.  He denies any dysphagia, nausea, vomiting, abdominal pain, bloating, changes in bowel habits, constipation, diarrhea, black or bloody bowel movements, rectal bleeding, unintentional weight loss.  He had 1 instance of rectal bleeding about 2 years ago, which lasted 1 day, and has never recurred.  He has 2 BMs per day on average.  He denies any family history of colon cancer or colon polyps.        REVIEW OF SYSTEMS:  10 point ROS reviewed and negative, except as above    Historical Information   Past Medical History:   Diagnosis Date    Chronic back pain     COVID 09/2021    Essential hypertension     Kidney stone     Lumbar herniated disc     Pneumonia     as child    Sciatica     Seasonal allergies     Stroke (HCC) 12/20/2023     Past Surgical History:   Procedure Laterality Date    BACK SURGERY       FL RETROGRADE PYELOGRAM  2/17/2022    HAND SURGERY      NASAL SEPTUM SURGERY      KY CYSTO/URETERO W/LITHOTRIPSY &INDWELL STENT INSRT Right 2/17/2022    Procedure: CYSTO URETEROSCOPY W/ LITHO HOLMIUM LASER, RETROGRADE PYELOGRAM, RIGHT URETERAL STENT PLACEMENT;  Surgeon: Valentin Serrano MD;  Location: [facility];  Service: Urology    KY LAMNOTMY INCL W/DCMPRSN NRV ROOT 1 INTRSPC LUMBR Left 11/7/2016    Procedure: L5-S1 MINIMALLY INVASIVE MICROSCOPIC DISCECTOMY AND POSSIBLE EPIDURAL STEROID INJECTION ;  Surgeon: Bobby Alvarez MD;  Location: [facility];  Service: Neurosurgery    WRIST SURGERY       Social History   Social History     Substance and Sexual Activity   Alcohol Use Not Currently    Comment: occasional     Social History     Substance and Sexual Activity   Drug Use No     Social History     Tobacco Use   Smoking Status Never   Smokeless Tobacco Never     Family History   Problem Relation Age of Onset    Heart attack Mother     Kidney disease Father     Heart failure Father     Deep vein thrombosis Father     Urolithiasis Father        Meds/Allergies       Current Outpatient Medications:     aspirin (ECOTRIN LOW STRENGTH) 81 mg EC tablet    atorvastatin (LIPITOR) 40 mg tablet    clopidogrel (PLAVIX) 75 mg tablet    sildenafil (VIAGRA) 50 MG tablet    Allergies   Allergen Reactions    Shellfish-Derived Products - Food Allergy Diarrhea and Vomiting           Objective   Wt Readings from Last 3 Encounters:   12/22/23 83 kg (183 lb)   12/17/23 85.7 kg (189 lb)   05/24/22 83.9 kg (185 lb)     Temp Readings from Last 3 Encounters:   12/22/23 97.5 °F (36.4 °C)   12/17/23 97.8 °F (36.6 °C) (Temporal)   02/17/22 98 °F (36.7 °C) (Temporal)     BP Readings from Last 3 Encounters:   12/22/23 120/78   12/17/23 135/85   05/24/22 140/80     Pulse Readings from Last 3 Encounters:   12/22/23 98   12/17/23 99   05/24/22 103        PHYSICAL EXAM:     Physical Exam  Vitals reviewed.   Constitutional:       General: He is  not in acute distress.     Appearance: He is well-developed.   HENT:      Head: Normocephalic and atraumatic.   Eyes:      Conjunctiva/sclera: Conjunctivae normal.   Cardiovascular:      Rate and Rhythm: Normal rate and regular rhythm.      Heart sounds: No murmur heard.  Pulmonary:      Effort: Pulmonary effort is normal. No respiratory distress.      Breath sounds: Normal breath sounds.   Abdominal:      General: Bowel sounds are normal. There is no distension.      Palpations: Abdomen is soft.      Tenderness: There is no abdominal tenderness.   Musculoskeletal:         General: No swelling.      Cervical back: Neck supple.   Skin:     General: Skin is warm and dry.   Neurological:      Mental Status: He is alert.   Psychiatric:         Mood and Affect: Mood normal.             Lab Results:   No visits with results within 1 Day(s) from this visit.   Latest known visit with results is:   Admission on 12/15/2023, Discharged on 12/17/2023   Component Date Value    WBC 12/15/2023 10.23 (H)     RBC 12/15/2023 5.59     Hemoglobin 12/15/2023 15.7     Hematocrit 12/15/2023 47.4     MCV 12/15/2023 85     MCH 12/15/2023 28.1     MCHC 12/15/2023 33.1     RDW 12/15/2023 12.7     MPV 12/15/2023 11.1     Platelets 12/15/2023 339     nRBC 12/15/2023 0     Neutrophils Relative 12/15/2023 65     Immat GRANS % 12/15/2023 0     Lymphocytes Relative 12/15/2023 20     Monocytes Relative 12/15/2023 8     Eosinophils Relative 12/15/2023 6     Basophils Relative 12/15/2023 1     Neutrophils Absolute 12/15/2023 6.67     Immature Grans Absolute 12/15/2023 0.03     Lymphocytes Absolute 12/15/2023 2.01     Monocytes Absolute 12/15/2023 0.84     Eosinophils Absolute 12/15/2023 0.59     Basophils Absolute 12/15/2023 0.09     Protime 12/15/2023 12.8     INR 12/15/2023 0.94     PTT 12/15/2023 29     Sodium 12/15/2023 137     Potassium 12/15/2023 3.8     Chloride 12/15/2023 101     CO2 12/15/2023 29     ANION GAP 12/15/2023 7     BUN 12/15/2023  17     Creatinine 12/15/2023 1.01     Glucose 12/15/2023 97     Calcium 12/15/2023 9.9     AST 12/15/2023 16     ALT 12/15/2023 16     Alkaline Phosphatase 12/15/2023 142 (H)     Total Protein 12/15/2023 7.7     Albumin 12/15/2023 4.4     Total Bilirubin 12/15/2023 0.49     eGFR 12/15/2023 82     hs TnI 0hr 12/15/2023 <2     Ventricular Rate 12/15/2023 107     Atrial Rate 12/15/2023 107     DC Interval 12/15/2023 146     QRSD Interval 12/15/2023 80     QT Interval 12/15/2023 338     QTC Interval 12/15/2023 451     P Axis 12/15/2023 76     QRS [place] 12/15/2023 68     T Wave [place] 12/15/2023 60     POC Glucose 12/15/2023 93     hs TnI 2hr 12/15/2023 <2     Delta 2hr hsTnI 12/15/2023      Ventricular Rate 12/15/2023 94     Atrial Rate 12/15/2023 94     DC Interval 12/15/2023 148     QRSD Interval 12/15/2023 76     QT Interval 12/15/2023 346     QTC Interval 12/15/2023 432     P Axis 12/15/2023 70     QRS Axis 12/15/2023 62     T Wave [place] 12/15/2023 55     Cholesterol 12/16/2023 142     Triglycerides 12/16/2023 114     HDL, Direct 12/16/2023 31 (L)     LDL Calculated 12/16/2023 88     Hemoglobin A1C 12/16/2023 5.7 (H)     EAG 12/16/2023 117     RAA A4C 12/17/2023 9.4     ROBERT A4C 12/17/2023 10.7     LA Volume Index (BP) 12/17/2023 11.1     MV [place] 12/17/2023 0.77     MV stenosis pressure 1/2* 12/17/2023 48     MV [place] 12/17/2023 53     E wave deceleration time 12/17/2023 164     E/A ratio 12/17/2023 0.69     MV valve area p 1/2 meth* 12/17/2023 4.58     RVID d 12/17/2023 3.5     A4C EF 12/17/2023 61     Left ventricular stroke * 12/17/2023 69.00     IVSd 12/17/2023 0.90     Tricuspid annular plane * 12/17/2023 2.00     Ao root 12/17/2023 3.20     LVPWd 12/17/2023 0.90     LA size 12/17/2023 3.1     LA volume (BP) 12/17/2023 23     FS 12/17/2023 44     LVIDS 12/17/2023 2.50     IVS 12/17/2023 0.9     LVIDd 12/17/2023 4.50     LA length (A2C) 12/17/2023 3.60     LEFT VENTRICLE SYSTOLIC * 12/17/2023 23     LV  DIASTOLIC VOLUME (MOD* 12/17/2023 92     Left Atrium Area-systoli* 12/17/2023 10.7     Left Atrium Area-systoli* 12/17/2023 9.5     MV E' Tissue Velocity Se* 12/17/2023 7     LVSV, 2D 12/17/2023 69     LV EF 12/17/2023 58        Lab Results   Component Value Date    WBC 10.23 (H) 12/15/2023    HGB 15.7 12/15/2023    HCT 47.4 12/15/2023    MCV 85 12/15/2023     12/15/2023       Lab Results   Component Value Date    SODIUM 137 12/15/2023    K 3.8 12/15/2023     12/15/2023    CO2 29 12/15/2023    AGAP 7 12/15/2023    BUN 17 12/15/2023    CREATININE 1.01 12/15/2023    GLUC 97 12/15/2023    GLUF 99 08/23/2014    CALCIUM 9.9 12/15/2023    AST 16 12/15/2023    ALT 16 12/15/2023    ALKPHOS 142 (H) 12/15/2023    TP 7.7 12/15/2023    TBILI 0.49 12/15/2023    EGFR 82 12/15/2023         Radiology Results:   No results found.

## 2024-03-06 NOTE — TELEPHONE ENCOUNTER
Procedure: Colonoscopy  Date: April 22  Physician performing: Dr. Mckeon  Location of procedure:    Instructions given to patient: Miralax  Diabetic: n/a  Clearances: Neurology clearance - appt March 20th [facility]  Plavix is discontinued at this time

## 2024-03-20 ENCOUNTER — OFFICE VISIT (OUTPATIENT)
Dept: NEUROLOGY | Facility: CLINIC | Age: 59
End: 2024-03-20
Payer: COMMERCIAL

## 2024-03-20 VITALS
HEART RATE: 96 BPM | SYSTOLIC BLOOD PRESSURE: 127 MMHG | WEIGHT: 181.4 LBS | TEMPERATURE: 97 F | BODY MASS INDEX: 24.04 KG/M2 | HEIGHT: 73 IN | DIASTOLIC BLOOD PRESSURE: 72 MMHG

## 2024-03-20 DIAGNOSIS — G47.19 EXCESSIVE DAYTIME SLEEPINESS: ICD-10-CM

## 2024-03-20 DIAGNOSIS — Z86.73 HISTORY OF STROKE: ICD-10-CM

## 2024-03-20 DIAGNOSIS — R06.83 SNORING: Primary | ICD-10-CM

## 2024-03-20 PROBLEM — I63.9 ACUTE CVA (CEREBROVASCULAR ACCIDENT) (HCC): Status: RESOLVED | Noted: 2023-12-15 | Resolved: 2024-03-20

## 2024-03-20 PROBLEM — U07.1 COVID: Status: RESOLVED | Noted: 2021-09-01 | Resolved: 2024-03-20

## 2024-03-20 PROBLEM — I63.9 CEREBROVASCULAR ACCIDENT (CVA) (HCC): Status: RESOLVED | Noted: 2023-12-30 | Resolved: 2024-03-20

## 2024-03-20 PROCEDURE — 99215 OFFICE O/P EST HI 40 MIN: CPT

## 2024-03-20 NOTE — ASSESSMENT & PLAN NOTE
He does report a hx loud snoring and excessive daytime sleepiness which could be concerning for ARIEL.    Given his recent CVA I am recommending he proceed with a sleep study to evaluate for ARIEL further. We discussed the risk of CVA with undiagnosed/untreated ARIEL.

## 2024-03-20 NOTE — PATIENT INSTRUCTIONS
- Continue with good blood pressure control; I would recommend monitoring at home at least 3 times per week; Goal of <130/80; at goal   - Continue with good cholesterol control; Goal LDL <70; continue on Atorvastatin 40 mg daily   - Continue with good blood sugar control; Goal HgbA1c <7.0; at goal   - Will defer monitoring of cholesterol and blood sugar and management of hypertensive medications to the primary care provider  - Stay well hydrated by drinking enough water   - Complete carotid ultrasound (call [phone] to schedule)  - Eat a healthy diet, high in lean meats fish, turkey, chicken. Low in fats, cholesterol, sugars and sodium. Avoid canned foods,  get lets of fresh/frozen vegetables/fruits.  - Get routine exercise/physical activity as much as able to tolerate  - Keep follow ups with your other health care providers  - Continue Aspirin 81 mg daily and Lipitor (Atorvastatin) 40 mg daily.  - Complete sleep study     I will plan for him to return to the office in 8 months time but would be happy to see him sooner if the need should arise.  If he has any symptoms concerning for TIA or stroke including sudden painless loss of vision or double vision, difficulty speaking or swallowing, vertigo/room spinning that does not quickly resolve, or weakness/numbness affecting 1 side of the face or body he should proceed by ambulance to the nearest emergency room immediately.

## 2024-03-20 NOTE — PROGRESS NOTES
"Roberto Emerson is a 58-year-old male with no pertinent past medical history nor daily medications, presenting as prehospital stroke alert on 12/15/23 after awakening with severe left-sided neck pain as well as right-sided atypical sensation to the extremities (felt \"asleep\").  While at work, coworkers also noted slight dysarthria.  Slight right upper extremity drift per EMS.     Of note, patient notes similar episode of R sided abnormal sensation happened approximately 2 to 3 weeks ago (lasting several hours before resolving).     Hypertensive in route per EMS (200s/100s) NIHSS of 1 during alert. No acute findings on neuroimaging.     MRI brain yesterday confirmed L thalamic infarction, appears small vessel in etiology given location  12/16: R sided symptoms have resolved, non-focal neuro exam.     Neuroimaging during alert:  -CT head negative for acute intracranial pathology  -CTA head/neck negative for LVO, no other flow restrictive disease/vascular abnormalities.   -MRI brain with L thalamic infarction     Plan:  -acute ischemic stroke pathway ongoing:  -2D echocardiogram pending  -continue DAPT (aspirin 81 mg/plavix 75 mg); likely for 3 weeks, followed by aspirin monotherapy  -continue lipitor 40 mg daily  -hemoglobin A1C pending, lipid panel with LDL of 88  -can pursue normotensive goals  -neuro checks  -telemetry monitoring  -provide stroke education  -therapy evaluations  -stroke/vascular neurology follow up to be scheduled once patient medically ready for discharge     Once 2D echo reviewed, no further inpatient neurologic workup anticipated. Discussed plan of care with attending neurologist.     Roberto Emerson will need follow up in in 6 weeks with neurovascular attending or advance practitioner. He will not require outpatient neurological testing.    Attending Attestation:  MRI confirmed left thalamic/posterior IC acute infarction     He is currently asymptomatic with resolution of prior right-sided " numbness.  His blood pressures have improved.  No reported dysrhythmia on telemetry  Echo expected to be completed tomorrow.  Currently on DAPT and statin, tolerating.  Lipid profile notable for decreased HDL, otherwise unremarkable.  A1c is pending.  If the patient's echocardiogram shows no remarkable abnormality, and no significant dysrhythmias detected on telemetry, anticipate he should be able to be discharged tomorrow.  Additional recommendations as detailed below   -----------------------------------------------------------------------------------------    Secondary Stroke Prevention  Completed 3 weeks of DAPT (ASA/Plavix)  Continues on Aspirin 81 mg and Atorvastatin 40 mg daily at this time   Compliant with his medications, no issues affording or obtaining medications.   Denies excessive bruising or bleeding     Deficits  Reports complete resolution of slurred speech, imbalance, and sensory deficits  Denies numbness, tingling, weakness, dizziness, headaches, vision changes or any new or worsening stroke like symptoms    Monitoring  BP  HgbA1c 12/16/23 5.7  LDL 12/16/23 88  CTA 12/15/23:  RIGHT EXTRACRANIAL CAROTID SEGMENT: Mild calcified and noncalcified atherosclerotic disease within the distal common carotid artery extending into the proximal cervical internal carotid artery with no ulceration or significant stenosis identified.  LEFT EXTRACRANIAL CAROTID SEGMENT: Minor eccentric calcification anteriorly just distal to the ICA origin without stenosis or ulceration.  Echo 12/17/23:   Left Ventricle: Left ventricular cavity size is normal. Wall thickness is normal. The left ventricular ejection fraction is 58% by visual estimation. Systolic function is normal. Wall motion is normal. Diastolic function is mildly abnormal, consistent with grade I (abnormal) relaxation.    Right Ventricle: Right ventricular cavity size is normal. Systolic function is normal.    Aortic Valve: There is aortic valve sclerosis.     Mitral Valve: There is mild annular calcification.    Tricuspid Valve: Pulmonary artery systolic pressures cannot be estimated due to lack of tricuspid regurgitation jet.    There is no study for comparison     Safety  Independent of all ADLs  No falls at home  Meds  finances  assistive devices  driving  memory    Substance abuse    Smoking- denies prior/current use  Etoh  Drugs- denies  Caffeine      Sleep  ARIEL     Diet         Exercise       PT/OT/ST       Mood       Follow ups      Return to work  Back at work with no restrictions

## 2024-03-20 NOTE — ASSESSMENT & PLAN NOTE
Roberto Emerson is a 58 year old male with a recent left thalamic CVA (12/15/2023), appearing to be small vessel in etiology given location. He is now maintained on Aspirin 81 mg and Atorvastatin 40 mg daily.  He denies any new or worsening neurologic symptoms concerning for TIA or Stroke. He denies any residual deficits from his stroke. Overall his vascular risk factors appear to be well controlled. He does report a hx loud snoring and excessive daytime sleepiness which could be concerning for ARIEL; given his recent CVA I am recommending he proceed with a sleep study to evaluate for this further. We discussed the risk of CVA with undiagnosed/treated ARIEL. We reviewed his stroke risk factors and management of the same. He was provided stroke education and we reviewed stroke warning signs/symptoms and reasons to return by ambulance to the ER. Plan as outlined below.       Plan:  - Continue with good blood pressure control; I would recommend monitoring at home at least 3 times per week; Goal of <130/80; at goal   - Continue with good cholesterol control; Goal LDL <70; continue on Atorvastatin 40 mg daily   - Continue with good blood sugar control; Goal HgbA1c <7.0; at goal   - Will defer monitoring of cholesterol and blood sugar and management of hypertensive medications to the primary care provider  - Stay well hydrated by drinking enough water   - Complete carotid ultrasound (call [phone] to schedule)  - Eat a healthy diet, high in lean meats fish, turkey, chicken. Low in fats, cholesterol, sugars and sodium. Avoid canned foods,  get lets of fresh/frozen vegetables/fruits.  - Get routine exercise/physical activity as much as able to tolerate  - Keep follow ups with your other health care providers  - Continue Aspirin 81 mg daily and Lipitor (Atorvastatin) 40 mg daily.  - Complete sleep study     I will plan for him to return to the office in 8 months time but would be happy to see him sooner if the need should  arise.  If he has any symptoms concerning for TIA or stroke including sudden painless loss of vision or double vision, difficulty speaking or swallowing, vertigo/room spinning that does not quickly resolve, or weakness/numbness affecting 1 side of the face or body he should proceed by ambulance to the nearest emergency room immediately.

## 2024-03-20 NOTE — PROGRESS NOTES
Patient ID: Roberto Emerson is a 58 y.o. male.    Assessment/Plan:    History of stroke  Roberto Emerson is a 58 year old male with a recent left thalamic CVA (12/15/2023), appearing to be small vessel in etiology given location. He is now maintained on Aspirin 81 mg and Atorvastatin 40 mg daily.  He denies any new or worsening neurologic symptoms concerning for TIA or Stroke. He denies any residual deficits from his stroke. Overall his vascular risk factors appear to be well controlled. He does report a hx loud snoring and excessive daytime sleepiness which could be concerning for ARIEL; given his recent CVA I am recommending he proceed with a sleep study to evaluate for this further. We discussed the risk of CVA with undiagnosed/treated ARIEL. We reviewed his stroke risk factors and management of the same. He was provided stroke education and we reviewed stroke warning signs/symptoms and reasons to return by ambulance to the ER. Plan as outlined below.       Plan:  - Continue with good blood pressure control; I would recommend monitoring at home at least 3 times per week; Goal of <130/80; at goal   - Continue with good cholesterol control; Goal LDL <70; continue on Atorvastatin 40 mg daily   - Continue with good blood sugar control; Goal HgbA1c <7.0; at goal   - Will defer monitoring of cholesterol and blood sugar and management of hypertensive medications to the primary care provider  - Stay well hydrated by drinking enough water   - Complete carotid ultrasound (call [phone] to schedule)  - Eat a healthy diet, high in lean meats fish, turkey, chicken. Low in fats, cholesterol, sugars and sodium. Avoid canned foods,  get lets of fresh/frozen vegetables/fruits.  - Get routine exercise/physical activity as much as able to tolerate  - Keep follow ups with your other health care providers  - Continue Aspirin 81 mg daily and Lipitor (Atorvastatin) 40 mg daily.  - Complete sleep study     I will plan for him to return to  "the office in 8 months time but would be happy to see him sooner if the need should arise.  If he has any symptoms concerning for TIA or stroke including sudden painless loss of vision or double vision, difficulty speaking or swallowing, vertigo/room spinning that does not quickly resolve, or weakness/numbness affecting 1 side of the face or body he should proceed by ambulance to the nearest emergency room immediately.    Snoring  He does report a hx loud snoring and excessive daytime sleepiness which could be concerning for ARIEL.    Given his recent CVA I am recommending he proceed with a sleep study to evaluate for ARIEL further. We discussed the risk of CVA with undiagnosed/untreated ARIEL.        Diagnoses and all orders for this visit:    Snoring  -     Ambulatory Referral to Sleep Medicine; Future    Excessive daytime sleepiness  -     Ambulatory Referral to Sleep Medicine; Future    History of stroke  -     Ambulatory Referral to Neurology  -     Ambulatory Referral to Sleep Medicine; Future         I have spent a total time of 40 minutes on 03/20/24 in caring for this patient including Diagnostic results, Prognosis, Risks and benefits of tx options, Instructions for management, Patient and family education, Importance of tx compliance, Risk factor reductions, Impressions, Documenting in the medical record, Reviewing / ordering tests, medicine, procedures  , and Obtaining or reviewing history  .      Subjective:    GEOVANNA Emerson is a 58-year-old male with no pertinent past medical history nor daily medications, presenting as prehospital stroke alert on 12/15/23 after awakening with severe left-sided neck pain as well as right-sided atypical sensation to the extremities (felt \"asleep\").  While at work, coworkers also noted slight dysarthria.  Slight right upper extremity drift per EMS.     Of note, patient notes similar episode of R sided abnormal sensation happened approximately 2 to 3 weeks ago (lasting " several hours before resolving).     Hypertensive in route per EMS (200s/100s) NIHSS of 1 during alert. No acute findings on neuroimaging.     MRI brain yesterday confirmed L thalamic infarction, appears small vessel in etiology given location    12/16: R sided symptoms have resolved, non-focal neuro exam.     Neuroimaging during alert:  -CT head negative for acute intracranial pathology  -CTA head/neck negative for LVO, no other flow restrictive disease/vascular abnormalities.   -MRI brain with L thalamic infarction     Plan:  -acute ischemic stroke pathway ongoing:  -2D echocardiogram pending  -continue DAPT (aspirin 81 mg/plavix 75 mg); likely for 3 weeks, followed by aspirin monotherapy  -continue lipitor 40 mg daily  -hemoglobin A1C pending, lipid panel with LDL of 88  -can pursue normotensive goals  -neuro checks  -telemetry monitoring  -provide stroke education  -therapy evaluations  -stroke/vascular neurology follow up to be scheduled once patient medically ready for discharge  -Once 2D echo reviewed, no further inpatient neurologic workup anticipated. Discussed plan of care with attending neurologist.  -Roberto Emerson will need follow up in in 6 weeks with neurovascular attending or advance practitioner. He will not require outpatient neurological testing.    Attending Attestation:  MRI confirmed left thalamic/posterior IC acute infarction     He is currently asymptomatic with resolution of prior right-sided numbness.  His blood pressures have improved.  No reported dysrhythmia on telemetry  Echo expected to be completed tomorrow.  Currently on DAPT and statin, tolerating.  Lipid profile notable for decreased HDL, otherwise unremarkable.  A1c is pending.  If the patient's echocardiogram shows no remarkable abnormality, and no significant dysrhythmias detected on telemetry, anticipate he should be able to be discharged tomorrow.  Additional recommendations as detailed below    -----------------------------------------------------------------------------------------    Roberto Emerson presents today for hospital follow up. He denies any new or worsening neurologic symptoms concerning for TIA or Stroke.    Secondary Stroke Prevention  Confirms he was not on Aspirin at the time of his stroke   Completed 3 weeks of DAPT (ASA/Plavix)  Continues on Aspirin 81 mg and Atorvastatin 40 mg daily at this time   Compliant with his medications, no issues affording or obtaining medications.   Denies excessive bruising or bleeding     Deficits  Reports complete resolution of slurred speech, imbalance, and sensory deficits  Denies numbness, tingling, weakness, dizziness, headaches, vision changes or any new or worsening stroke like symptoms  Denies any difficulty with swallowing     Monitoring  BP today 127/72; does not routinely monitor BP   HgbA1c 12/16/23 5.7  LDL 12/16/23 88  CTA 12/15/23:  RIGHT EXTRACRANIAL CAROTID SEGMENT: Mild calcified and noncalcified atherosclerotic disease within the distal common carotid artery extending into the proximal cervical internal carotid artery with no ulceration or significant stenosis identified.  LEFT EXTRACRANIAL CAROTID SEGMENT: Minor eccentric calcification anteriorly just distal to the ICA origin without stenosis or ulceration.  Echo 12/17/23:   Left Ventricle: Left ventricular cavity size is normal. Wall thickness is normal. The left ventricular ejection fraction is 58% by visual estimation. Systolic function is normal. Wall motion is normal. Diastolic function is mildly abnormal, consistent with grade I (abnormal) relaxation.    Right Ventricle: Right ventricular cavity size is normal. Systolic function is normal.    Aortic Valve: There is aortic valve sclerosis.    Mitral Valve: There is mild annular calcification.    Tricuspid Valve: Pulmonary artery systolic pressures cannot be estimated due to lack of tricuspid regurgitation jet.    There is no study for  "comparison     Safety  Lives with his 19 yo daughter (is  from his wife)  Independent of all ADLs  No falls at home  Manages his own medications and finances  assistive devices- none  Driving- no difficulty  Memory- no difficulty     Substance use    Smoking- denies prior/current use  Etoh- never  Drugs- denies  Caffeine- none; all decaf  coffee/soda     Sleep  Denies difficulty going to sleep or falling asleep  Excessive daytime sleepiness; denies refreshed sleep  + loud Snoring  Has never had a sleep study  Denies choking, coughing, gasping  Does wake a few times a night to use the restroom    Diet  Does not eat fast food  Tries to limit salt  Is increasing chicken      Exercise  Bowling 2 times a week   Very active at work      PT/OT/ST  Did not require s/p CVA     Mood  Denies anxiety or depression      Follow ups  Has followed up with his PCP since his CVA     Return to work   at [employer]  Back at work with no restrictions  Denies any difficulty     The following portions of the patient's history were reviewed and updated as appropriate: allergies, current medications, past family history, past medical history, past social history, past surgical history, and problem list.     Objective:    Blood pressure 127/72, pulse 96, temperature (!) 97 °F (36.1 °C), temperature source Temporal, height 6' 1\" (1.854 m), weight 82.3 kg (181 lb 6.4 oz).    Neurological Exam  On neurological examination patient is alert, awake, oriented and in no distress. Speech is fluent without dysarthria or aphasia. Cranial nerves 2-12 were symmetrically intact bilaterally. No evidence of any focal weakness or sensory loss in the upper or lower extremities. Motor testing reveals 5/5 strength of the bilateral upper and lower extremities.There was no pronator drift.  No fasciculations present. No abnormal involuntary movements. Finger- to-nose reveals no tremor or ataxia and intact proprioceptive function, no dysmetria was " noted. Rapid alternating movement normal. Sensation was intact to vibration, light touch, and temperature in bilateral upper and lower extremities. Deep tendon reflexes were 2+ and symmetric in the bilateral upper and lower extremities. He is able to rise easily without assistance from a seated position. Casual gait is normal including stance, stride, and arm swing. Normal tandem gait. Romberg is absent.       ROS:    Review of Systems   Constitutional:  Negative for appetite change, fatigue and fever.   HENT: Negative.  Negative for hearing loss, tinnitus, trouble swallowing and voice change.    Eyes: Negative.  Negative for photophobia, pain and visual disturbance.   Respiratory: Negative.  Negative for shortness of breath.    Cardiovascular: Negative.  Negative for palpitations.   Gastrointestinal: Negative.  Negative for nausea and vomiting.   Endocrine: Negative.  Negative for cold intolerance.   Genitourinary: Negative.  Negative for dysuria, frequency and urgency.   Musculoskeletal:  Negative for back pain, gait problem, myalgias, neck pain and neck stiffness.   Skin: Negative.  Negative for rash.   Allergic/Immunologic: Negative.    Neurological: Negative.  Negative for dizziness, tremors, seizures, syncope, facial asymmetry, speech difficulty, weakness, light-headedness, numbness and headaches.   Hematological: Negative.  Does not bruise/bleed easily.   Psychiatric/Behavioral: Negative.  Negative for confusion, hallucinations and sleep disturbance.    All other systems reviewed and are negative.    Reviewed ROS as entered by medical assistant.

## 2024-03-27 ENCOUNTER — TELEPHONE (OUTPATIENT)
Dept: FAMILY MEDICINE CLINIC | Facility: CLINIC | Age: 59
End: 2024-03-27

## 2024-03-27 NOTE — TELEPHONE ENCOUNTER
[facility] called asking the protocol for dental cleaning with pt who recently had stroke. Spoke with TK & CHING and relayed that IA is not in office and the dentist should use his judgement. If pt would need clearance he would need to make an appt to be seen.

## 2024-03-28 DIAGNOSIS — Z86.73 HISTORY OF STROKE: ICD-10-CM

## 2024-03-28 DIAGNOSIS — R06.83 SNORING: ICD-10-CM

## 2024-03-28 DIAGNOSIS — G47.19 EXCESSIVE DAYTIME SLEEPINESS: Primary | ICD-10-CM

## 2024-03-29 ENCOUNTER — APPOINTMENT (OUTPATIENT)
Dept: LAB | Facility: CLINIC | Age: 59
End: 2024-03-29
Payer: COMMERCIAL

## 2024-03-29 ENCOUNTER — OFFICE VISIT (OUTPATIENT)
Dept: FAMILY MEDICINE CLINIC | Facility: CLINIC | Age: 59
End: 2024-03-29
Payer: COMMERCIAL

## 2024-03-29 VITALS
OXYGEN SATURATION: 96 % | DIASTOLIC BLOOD PRESSURE: 76 MMHG | HEART RATE: 102 BPM | HEIGHT: 73 IN | BODY MASS INDEX: 24.07 KG/M2 | WEIGHT: 181.6 LBS | SYSTOLIC BLOOD PRESSURE: 110 MMHG | TEMPERATURE: 97.6 F

## 2024-03-29 DIAGNOSIS — E78.5 HYPERLIPIDEMIA, UNSPECIFIED HYPERLIPIDEMIA TYPE: ICD-10-CM

## 2024-03-29 DIAGNOSIS — I63.9 CEREBROVASCULAR ACCIDENT (CVA), UNSPECIFIED MECHANISM (HCC): Primary | ICD-10-CM

## 2024-03-29 DIAGNOSIS — I63.9 CEREBROVASCULAR ACCIDENT (CVA), UNSPECIFIED MECHANISM (HCC): ICD-10-CM

## 2024-03-29 DIAGNOSIS — Z23 ENCOUNTER FOR IMMUNIZATION: ICD-10-CM

## 2024-03-29 DIAGNOSIS — R73.03 PRE-DIABETES: ICD-10-CM

## 2024-03-29 DIAGNOSIS — Z11.59 NEED FOR HEPATITIS C SCREENING TEST: ICD-10-CM

## 2024-03-29 DIAGNOSIS — Z11.4 SCREENING FOR HIV (HUMAN IMMUNODEFICIENCY VIRUS): ICD-10-CM

## 2024-03-29 LAB
ALBUMIN SERPL BCP-MCNC: 4.3 G/DL (ref 3.5–5)
ALP SERPL-CCNC: 135 U/L (ref 34–104)
ALT SERPL W P-5'-P-CCNC: 34 U/L (ref 7–52)
ANION GAP SERPL CALCULATED.3IONS-SCNC: 8 MMOL/L (ref 4–13)
AST SERPL W P-5'-P-CCNC: 23 U/L (ref 13–39)
BASOPHILS # BLD AUTO: 0.09 THOUSANDS/ÂΜL (ref 0–0.1)
BASOPHILS NFR BLD AUTO: 1 % (ref 0–1)
BILIRUB SERPL-MCNC: 0.5 MG/DL (ref 0.2–1)
BUN SERPL-MCNC: 13 MG/DL (ref 5–25)
CALCIUM SERPL-MCNC: 9.3 MG/DL (ref 8.4–10.2)
CHLORIDE SERPL-SCNC: 102 MMOL/L (ref 96–108)
CHOLEST SERPL-MCNC: 172 MG/DL
CO2 SERPL-SCNC: 29 MMOL/L (ref 21–32)
CREAT SERPL-MCNC: 0.94 MG/DL (ref 0.6–1.3)
EOSINOPHIL # BLD AUTO: 0.36 THOUSAND/ÂΜL (ref 0–0.61)
EOSINOPHIL NFR BLD AUTO: 5 % (ref 0–6)
ERYTHROCYTE [DISTWIDTH] IN BLOOD BY AUTOMATED COUNT: 13.4 % (ref 11.6–15.1)
EST. AVERAGE GLUCOSE BLD GHB EST-MCNC: 105 MG/DL
GFR SERPL CREATININE-BSD FRML MDRD: 90 ML/MIN/1.73SQ M
GLUCOSE P FAST SERPL-MCNC: 90 MG/DL (ref 65–99)
HBA1C MFR BLD: 5.3 %
HCT VFR BLD AUTO: 47.9 % (ref 36.5–49.3)
HDLC SERPL-MCNC: 49 MG/DL
HGB BLD-MCNC: 16.1 G/DL (ref 12–17)
HIV 1+2 AB+HIV1 P24 AG SERPL QL IA: NORMAL
HIV 2 AB SERPL QL IA: NORMAL
HIV1 AB SERPL QL IA: NORMAL
HIV1 P24 AG SERPL QL IA: NORMAL
IMM GRANULOCYTES # BLD AUTO: 0.02 THOUSAND/UL (ref 0–0.2)
IMM GRANULOCYTES NFR BLD AUTO: 0 % (ref 0–2)
LDLC SERPL CALC-MCNC: 97 MG/DL (ref 0–100)
LYMPHOCYTES # BLD AUTO: 1.84 THOUSANDS/ÂΜL (ref 0.6–4.47)
LYMPHOCYTES NFR BLD AUTO: 24 % (ref 14–44)
MCH RBC QN AUTO: 28.1 PG (ref 26.8–34.3)
MCHC RBC AUTO-ENTMCNC: 33.6 G/DL (ref 31.4–37.4)
MCV RBC AUTO: 84 FL (ref 82–98)
MONOCYTES # BLD AUTO: 0.69 THOUSAND/ÂΜL (ref 0.17–1.22)
MONOCYTES NFR BLD AUTO: 9 % (ref 4–12)
NEUTROPHILS # BLD AUTO: 4.7 THOUSANDS/ÂΜL (ref 1.85–7.62)
NEUTS SEG NFR BLD AUTO: 61 % (ref 43–75)
NONHDLC SERPL-MCNC: 123 MG/DL
NRBC BLD AUTO-RTO: 0 /100 WBCS
PLATELET # BLD AUTO: 290 THOUSANDS/UL (ref 149–390)
PMV BLD AUTO: 11 FL (ref 8.9–12.7)
POTASSIUM SERPL-SCNC: 3.9 MMOL/L (ref 3.5–5.3)
PROT SERPL-MCNC: 7.1 G/DL (ref 6.4–8.4)
RBC # BLD AUTO: 5.72 MILLION/UL (ref 3.88–5.62)
SODIUM SERPL-SCNC: 139 MMOL/L (ref 135–147)
TRIGL SERPL-MCNC: 131 MG/DL
WBC # BLD AUTO: 7.7 THOUSAND/UL (ref 4.31–10.16)

## 2024-03-29 PROCEDURE — 87522 HEPATITIS C REVRS TRNSCRPJ: CPT

## 2024-03-29 PROCEDURE — 99213 OFFICE O/P EST LOW 20 MIN: CPT | Performed by: NURSE PRACTITIONER

## 2024-03-29 PROCEDURE — 90471 IMMUNIZATION ADMIN: CPT

## 2024-03-29 PROCEDURE — 36415 COLL VENOUS BLD VENIPUNCTURE: CPT

## 2024-03-29 PROCEDURE — 87389 HIV-1 AG W/HIV-1&-2 AB AG IA: CPT

## 2024-03-29 PROCEDURE — 80061 LIPID PANEL: CPT

## 2024-03-29 PROCEDURE — 90715 TDAP VACCINE 7 YRS/> IM: CPT

## 2024-03-29 PROCEDURE — 85025 COMPLETE CBC W/AUTO DIFF WBC: CPT

## 2024-03-29 PROCEDURE — 87521 HEPATITIS C PROBE&RVRS TRNSC: CPT

## 2024-03-29 PROCEDURE — 80053 COMPREHEN METABOLIC PANEL: CPT

## 2024-03-29 PROCEDURE — 83036 HEMOGLOBIN GLYCOSYLATED A1C: CPT

## 2024-03-29 RX ORDER — ATORVASTATIN CALCIUM 40 MG/1
40 TABLET, FILM COATED ORAL EVERY EVENING
Qty: 90 TABLET | Refills: 3 | Status: SHIPPED | OUTPATIENT
Start: 2024-03-29

## 2024-03-29 NOTE — PROGRESS NOTES
"[facility]    ASSESSMENT AND PLAN     1. Cerebrovascular accident (CVA), unspecified mechanism (HCC)  Assessment & Plan:  Patient doing well  Full recovery-no residual  Followed up with neurology -next follow-up 8 months  Continues with aspirin 81; atorvastatin 40    ER precaution with any new symptoms      Orders:  -     atorvastatin (LIPITOR) 40 mg tablet; Take 1 tablet (40 mg total) by mouth every evening    2. Encounter for immunization  -     TDAP VACCINE GREATER THAN OR EQUAL TO 8YO IM    3. Pre-diabetes    4. Hyperlipidemia, unspecified hyperlipidemia type  -     atorvastatin (LIPITOR) 40 mg tablet; Take 1 tablet (40 mg total) by mouth every evening       Note: Due for repeat lab work-fasting orders placed.  Next follow-up 6 months-annual physical.  Carotid study and thyroid ultrasound pending    SUBJECTIVE       Patient ID: Roberto Emerson is a 58 y.o. male.    Chief Complaint   Patient presents with    Follow-up       HISTORY OF PRESENT ILLNESS    Follow-up  3-month follow-up s/p stroke.  Doing well.  Reports full recovery.  No limitations or residual effects.  Did follow-up with neurology.  Compliant with aspirin and atorvastatin.  No new concerns today.          The following portions of the patient's history were reviewed and updated as appropriate: allergies, current medications, past family history, past medical history, past social history, past surgical history, and problem list.    REVIEW OF SYSTEMS  Review of Systems   Constitutional: Negative.    Respiratory: Negative.     Cardiovascular: Negative.    Gastrointestinal: Negative.    Genitourinary: Negative.    Musculoskeletal: Negative.    Neurological: Negative.    Psychiatric/Behavioral: Negative.         OBJECTIVE      VITAL SIGNS  /76 (BP Location: Left arm, Patient Position: Sitting, Cuff Size: Adult)   Pulse 102   Temp 97.6 °F (36.4 °C)   Ht 6' 1\" (1.854 m)   Wt 82.4 kg (181 lb 9.6 oz)   SpO2 96%   BMI 23.96 kg/m² "     CURRENT MEDICATIONS    Current Outpatient Medications:     aspirin (ECOTRIN LOW STRENGTH) 81 mg EC tablet, Take 1 tablet (81 mg total) by mouth daily, Disp: 90 tablet, Rfl: 0    atorvastatin (LIPITOR) 40 mg tablet, Take 1 tablet (40 mg total) by mouth every evening, Disp: 90 tablet, Rfl: 3    sildenafil (VIAGRA) 50 MG tablet, Take 1 tablet by mouth on empty stomach one hour prior to sexual activity (Patient not taking: Reported on 3/29/2024), Disp: 30 tablet, Rfl: 1      PHYSICAL EXAMINATION   Physical Exam  Vitals and nursing note reviewed.   Constitutional:       General: He is not in acute distress.     Appearance: Normal appearance. He is well-developed, well-groomed and normal weight. He is not ill-appearing.   HENT:      Head: Normocephalic.      Right Ear: Tympanic membrane and ear canal normal.      Left Ear: Tympanic membrane and ear canal normal.      Nose: Nose normal.      Mouth/Throat:      Mouth: Mucous membranes are moist.   Eyes:      Pupils: Pupils are equal, round, and reactive to light.   Neck:      Vascular: No carotid bruit.   Cardiovascular:      Rate and Rhythm: Normal rate and regular rhythm.      Heart sounds: Normal heart sounds.   Pulmonary:      Effort: Pulmonary effort is normal. No respiratory distress.      Breath sounds: Normal breath sounds and air entry.   Musculoskeletal:      Right lower leg: No edema.      Left lower leg: No edema.   Skin:     General: Skin is warm and dry.   Neurological:      General: No focal deficit present.      Mental Status: He is alert and oriented to person, place, and time.   Psychiatric:         Attention and Perception: Attention normal.         Mood and Affect: Mood normal.         Behavior: Behavior normal.         Thought Content: Thought content normal.         Judgment: Judgment normal.

## 2024-03-29 NOTE — ASSESSMENT & PLAN NOTE
Patient doing well  Full recovery-no residual  Followed up with neurology -next follow-up 8 months  Continues with aspirin 81; atorvastatin 40    ER precaution with any new symptoms

## 2024-04-01 LAB — HCV RNA SERPL NAA+PROBE-ACNC: NOT DETECTED K[IU]/ML

## 2024-04-03 ENCOUNTER — TELEPHONE (OUTPATIENT)
Dept: GASTROENTEROLOGY | Facility: CLINIC | Age: 59
End: 2024-04-03

## 2024-04-03 NOTE — TELEPHONE ENCOUNTER
Our mutual patient, Roberto Emerson, is scheduled for the following   procedure(s): Colonoscopy   On: April 22   With: Dr. Mckeon    Our office is requesting medical clearance for the upcoming procedure(s).        Thank you,  Marlen VIDALES  [facility]

## 2024-04-04 NOTE — TELEPHONE ENCOUNTER
Hello,    Yes it has been 3 months since his CVA and his vascular risk factors are well controlled. He is cleared to proceed with Colonoscopy with Dr. Mckeon 4/22/24. Thank you!    FILIPE Villagomez

## 2024-04-09 ENCOUNTER — PATIENT MESSAGE (OUTPATIENT)
Dept: GASTROENTEROLOGY | Facility: CLINIC | Age: 59
End: 2024-04-09

## 2024-04-22 ENCOUNTER — ANESTHESIA EVENT (OUTPATIENT)
Dept: GASTROENTEROLOGY | Facility: HOSPITAL | Age: 59
End: 2024-04-22

## 2024-04-22 ENCOUNTER — HOSPITAL ENCOUNTER (OUTPATIENT)
Dept: GASTROENTEROLOGY | Facility: HOSPITAL | Age: 59
Setting detail: OUTPATIENT SURGERY
Discharge: HOME | End: 2024-04-22
Payer: COMMERCIAL

## 2024-04-22 ENCOUNTER — ANESTHESIA (OUTPATIENT)
Dept: GASTROENTEROLOGY | Facility: HOSPITAL | Age: 59
End: 2024-04-22

## 2024-04-22 VITALS
WEIGHT: 181 LBS | DIASTOLIC BLOOD PRESSURE: 90 MMHG | RESPIRATION RATE: 18 BRPM | HEART RATE: 91 BPM | SYSTOLIC BLOOD PRESSURE: 132 MMHG | TEMPERATURE: 97.8 F | BODY MASS INDEX: 23.99 KG/M2 | OXYGEN SATURATION: 97 % | HEIGHT: 73 IN

## 2024-04-22 DIAGNOSIS — Z12.11 SCREEN FOR COLON CANCER: ICD-10-CM

## 2024-04-22 PROCEDURE — G0121 COLON CA SCRN NOT HI RSK IND: HCPCS | Performed by: INTERNAL MEDICINE

## 2024-04-22 RX ORDER — SODIUM CHLORIDE, SODIUM LACTATE, POTASSIUM CHLORIDE, CALCIUM CHLORIDE 600; 310; 30; 20 MG/100ML; MG/100ML; MG/100ML; MG/100ML
125 INJECTION, SOLUTION INTRAVENOUS CONTINUOUS
Status: DISCONTINUED | OUTPATIENT
Start: 2024-04-22 | End: 2024-04-26 | Stop reason: HOSPADM

## 2024-04-22 RX ORDER — PROPOFOL 10 MG/ML
INJECTION, EMULSION INTRAVENOUS AS NEEDED
Status: DISCONTINUED | OUTPATIENT
Start: 2024-04-22 | End: 2024-04-22

## 2024-04-22 RX ADMIN — PROPOFOL 100 MG: 10 INJECTION, EMULSION INTRAVENOUS at 10:08

## 2024-04-22 RX ADMIN — SODIUM CHLORIDE, SODIUM LACTATE, POTASSIUM CHLORIDE, AND CALCIUM CHLORIDE 125 ML/HR: .6; .31; .03; .02 INJECTION, SOLUTION INTRAVENOUS at 09:46

## 2024-04-22 RX ADMIN — PROPOFOL 100 MG: 10 INJECTION, EMULSION INTRAVENOUS at 10:12

## 2024-04-22 RX ADMIN — SODIUM CHLORIDE, SODIUM LACTATE, POTASSIUM CHLORIDE, AND CALCIUM CHLORIDE: .6; .31; .03; .02 INJECTION, SOLUTION INTRAVENOUS at 10:19

## 2024-04-22 RX ADMIN — PROPOFOL 50 MG: 10 INJECTION, EMULSION INTRAVENOUS at 10:15

## 2024-04-22 NOTE — H&P
[facility]  History & Physical    Patient Info:  Name: Roberto Emerson   Age: 58 y.o.   YOB: 1965   MRN: [id number]    HPI: Roberto Emerson is a 58 y.o. year old male who presents for index screening colonoscopy. No family history. On aspirin for hx CVA. No AC.    REVIEW OF SYSTEMS: Per the HPI, and otherwise unremarkable.    Historical Information   Past Medical History:   Diagnosis Date    Cerebrovascular accident (CVA) (HCC)     Chronic back pain     COVID 09/2021    Essential hypertension     Kidney stone     Lumbar herniated disc     Pneumonia     as child    Sciatica     Seasonal allergies     Stroke (HCC) 12/20/2023     Past Surgical History:   Procedure Laterality Date    BACK SURGERY      FL RETROGRADE PYELOGRAM  2/17/2022    HAND SURGERY      NASAL SEPTUM SURGERY      VA CYSTO/URETERO W/LITHOTRIPSY &INDWELL STENT INSRT Right 2/17/2022    Procedure: CYSTO URETEROSCOPY W/ LITHO HOLMIUM LASER, RETROGRADE PYELOGRAM, RIGHT URETERAL STENT PLACEMENT;  Surgeon: Valentin Serrano MD;  Location: [facility];  Service: Urology    VA LAMNOTMY INCL W/DCMPRSN NRV ROOT 1 INTRSPC LUMBR Left 11/7/2016    Procedure: L5-S1 MINIMALLY INVASIVE MICROSCOPIC DISCECTOMY AND POSSIBLE EPIDURAL STEROID INJECTION ;  Surgeon: Bobby Alvarez MD;  Location: [facility];  Service: Neurosurgery    WRIST SURGERY       Social History   Social History     Substance and Sexual Activity   Alcohol Use Not Currently    Comment: occasional     Social History     Substance and Sexual Activity   Drug Use No     Social History     Tobacco Use   Smoking Status Never   Smokeless Tobacco Never     Family History   Problem Relation Age of Onset    Heart attack Mother     Kidney disease Father     Heart failure Father     Deep vein thrombosis Father     Urolithiasis Father        MEDICATIONS & ALLERGIES:    Current Outpatient Medications   Medication Instructions    aspirin (ECOTRIN LOW STRENGTH) 81 mg, Oral,  "Daily    atorvastatin (LIPITOR) 40 mg, Oral, Every evening    sildenafil (VIAGRA) 50 MG tablet Take 1 tablet by mouth on empty stomach one hour prior to sexual activity     Allergies   Allergen Reactions    Shellfish-Derived Products - Food Allergy Diarrhea and Vomiting       PHYSICAL EXAM:    Objective   Blood pressure 151/90, pulse 93, temperature 97.9 °F (36.6 °C), temperature source Temporal, resp. rate 12, height 6' 1\" (1.854 m), weight 82.1 kg (181 lb), SpO2 97%. Body mass index is 23.88 kg/m².    Gen: NAD  CV: RRR  CHEST: Clear  ABD: Soft, NT/ND  EXT: No edema    ASSESSMENT AND PLAN:  This is a 58 y.o. year old male here for colonoscopy, and he is stable and optimized for his procedure.      Maria Elena Carranza D.O.  Gastroenterology Fellow  [facility]  Division of Gastroenterology & Hepatology  Available on [employer]    ** Please Note: This note is constructed using a voice recognition dictation system. **   "

## 2024-04-22 NOTE — ANESTHESIA POSTPROCEDURE EVALUATION
Post-Op Assessment Note    CV Status:  Stable  Pain Score: 0    Pain management: adequate       Mental Status:  Arousable   Hydration Status:  Stable   PONV Controlled:  None   Airway Patency:  Patent     Post Op Vitals Reviewed: Yes    No anethesia notable event occurred.    Staff: CRNA               BP   155/95   Temp   98   Pulse  85   Resp   16   SpO2   99

## 2024-04-22 NOTE — ANESTHESIA PREPROCEDURE EVALUATION
Procedure:  COLONOSCOPY    Relevant Problems   CARDIO   (+) Hyperlipidemia      MUSCULOSKELETAL   (+) Lumbar radicular pain      NEURO/PSYCH   (+) Cerebrovascular accident (CVA) (HCC)      Neurology/Sleep   (+) History of stroke      Other   (+) Snoring        Physical Exam    Airway    Mallampati score: II  TM Distance: <3 FB  Neck ROM: full     Dental   No notable dental hx     Cardiovascular  Cardiovascular exam normal    Pulmonary  Pulmonary exam normal     Other Findings        Anesthesia Plan  ASA Score- 3     Anesthesia Type- IV sedation with anesthesia with ASA Monitors.         Additional Monitors:     Airway Plan:            Plan Factors-Exercise tolerance (METS): >4 METS.    Chart reviewed.   Existing labs reviewed.     Patient is not a current smoker. Patient not instructed to abstain from smoking on day of procedure. Patient did not smoke on day of surgery.            Induction- intravenous.    Postoperative Plan-     Informed Consent- Anesthetic plan and risks discussed with patient.  I personally reviewed this patient with the CRNA. Discussed and agreed on the Anesthesia Plan with the CRNA..

## 2024-07-01 ENCOUNTER — OFFICE VISIT (OUTPATIENT)
Dept: FAMILY MEDICINE CLINIC | Facility: CLINIC | Age: 59
End: 2024-07-01
Payer: COMMERCIAL

## 2024-07-01 VITALS
WEIGHT: 191.6 LBS | TEMPERATURE: 98.2 F | OXYGEN SATURATION: 96 % | HEIGHT: 73 IN | SYSTOLIC BLOOD PRESSURE: 132 MMHG | BODY MASS INDEX: 25.39 KG/M2 | HEART RATE: 105 BPM | DIASTOLIC BLOOD PRESSURE: 82 MMHG

## 2024-07-01 DIAGNOSIS — R73.03 PRE-DIABETES: ICD-10-CM

## 2024-07-01 DIAGNOSIS — I63.9 CEREBROVASCULAR ACCIDENT (CVA), UNSPECIFIED MECHANISM (HCC): ICD-10-CM

## 2024-07-01 DIAGNOSIS — Z00.00 WELL ADULT EXAM: Primary | ICD-10-CM

## 2024-07-01 DIAGNOSIS — Z12.5 SCREENING FOR PROSTATE CANCER: ICD-10-CM

## 2024-07-01 DIAGNOSIS — R74.8 ELEVATED ALKALINE PHOSPHATASE LEVEL: ICD-10-CM

## 2024-07-01 PROCEDURE — 99396 PREV VISIT EST AGE 40-64: CPT | Performed by: NURSE PRACTITIONER

## 2024-07-05 NOTE — PROGRESS NOTES
Adult Annual Physical  Name: Roberto Emerson      : 1965      MRN: [id number]  Encounter Provider: FILIPE Sullivan  Encounter Date: 2024   Encounter department: [facility]    Assessment & Plan   1. Well adult exam  2. Pre-diabetes  -     Hemoglobin A1C; Future; Expected date: 10/01/2024  3. Elevated alkaline phosphatase level  -     Comprehensive metabolic panel; Future  4. Screening for prostate cancer  -     PSA, Total Screen; Future  5. Cerebrovascular accident (CVA), unspecified mechanism (HCC)  Assessment & Plan:  Patient continues doing well  Full recovery-no residual  Followed up with neurology -next follow-up 8 months  Continues with aspirin 81; atorvastatin 40     ER precaution with any new symptoms    Immunizations and preventive care screenings were discussed with patient today. Appropriate education was printed on patient's after visit summary.    Discussed risks and benefits of prostate cancer screening. We discussed the controversial history of PSA screening for prostate cancer in the [place] as well as the risk of over detection and over treatment of prostate cancer by way of PSA screening.  The patient understands that PSA blood testing is an imperfect way to screen for prostate cancer and that elevated PSA levels in the blood may also be caused by infection, inflammation, prostatic trauma or manipulation, urological procedures, or by benign prostatic enlargement.    The role of the digital rectal examination in prostate cancer screening was also discussed and I discussed with him that there is large interobserver variability in the findings of digital rectal examination.    Counseling:  Alcohol/drug use: discussed moderation in alcohol intake, the recommendations for healthy alcohol use, and avoidance of illicit drug use.  Dental Health: discussed importance of regular tooth brushing, flossing, and dental visits.  Injury prevention: discussed  safety/seat belts, safety helmets, smoke detectors, carbon dioxide detectors, and smoking near bedding or upholstery.  Sexual health: discussed sexually transmitted diseases, partner selection, use of condoms, avoidance of unintended pregnancy, and contraceptive alternatives.  Exercise: the importance of regular exercise/physical activity was discussed. Recommend exercise 3-5 times per week for at least 30 minutes.       Depression Screening and Follow-up Plan: Patient was screened for depression during today's encounter. They screened negative with a PHQ-2 score of 0.        History of Present Illness     Adult Annual Physical:  Patient presents for annual physical. Preventative care visit  Age-appropriate screening recommendations reviewed  Lab work was completed in March  Colonoscopy due in 2034  Immunizations reviewed-Tdap up-to-date; declines shingles (education provided)  Continue routine follow-up with neurology.     Diet and Physical Activity:  - Diet/Nutrition: well balanced diet.  - Exercise: walking. Advise increasing daily exercise outside of the work environment    Depression Screening:  - PHQ-2 Score: 0    General Health:  - Sleep: sleeps well.  - Hearing: normal hearing bilateral ears.  - Vision: no vision problems and goes for regular eye exams. Encourage yearly eye exams with them optometrist: Monitor acuity and general eye health  - Dental: regular dental visits.     Health:  - History of STDs: no.   - Urinary symptoms: none.     Advanced Care Planning:  - Has an advanced directive?: no    - Has a durable medical POA?: no    - ACP document given to patient?: no      Review of Systems   Constitutional: Negative.    HENT: Negative.     Eyes: Negative.    Respiratory: Negative.     Cardiovascular: Negative.    Gastrointestinal: Negative.    Genitourinary: Negative.    Musculoskeletal: Negative.    Skin: Negative.    Neurological: Negative.    Psychiatric/Behavioral: Negative.           Objective  "    /82 (BP Location: Left arm, Patient Position: Sitting, Cuff Size: Large)   Pulse 105   Temp 98.2 °F (36.8 °C) (Temporal)   Ht 6' 1\" (1.854 m)   Wt 86.9 kg (191 lb 9.6 oz)   SpO2 96%   BMI 25.28 kg/m²     Physical Exam  Vitals and nursing note reviewed.   Constitutional:       General: He is not in acute distress.     Appearance: Normal appearance. He is well-developed and well-groomed. He is not ill-appearing.   HENT:      Head: Normocephalic.      Right Ear: Tympanic membrane, ear canal and external ear normal.      Left Ear: Tympanic membrane, ear canal and external ear normal.      Nose: Nose normal.      Mouth/Throat:      Mouth: Mucous membranes are moist.      Pharynx: Oropharynx is clear.   Eyes:      Conjunctiva/sclera: Conjunctivae normal.      Pupils: Pupils are equal, round, and reactive to light.   Neck:      Thyroid: No thyromegaly.      Vascular: No carotid bruit.   Cardiovascular:      Rate and Rhythm: Normal rate and regular rhythm.      Pulses:           Posterior tibial pulses are 2+ on the right side and 2+ on the left side.      Heart sounds: Normal heart sounds.   Pulmonary:      Effort: Pulmonary effort is normal. No respiratory distress.      Breath sounds: Normal breath sounds and air entry.   Abdominal:      General: Bowel sounds are normal.      Palpations: Abdomen is soft.      Tenderness: There is no abdominal tenderness.   Musculoskeletal:      Right lower leg: No edema.      Left lower leg: No edema.   Lymphadenopathy:      Cervical: No cervical adenopathy.   Skin:     General: Skin is warm and dry.   Neurological:      General: No focal deficit present.      Mental Status: He is alert and oriented to person, place, and time.   Psychiatric:         Attention and Perception: Attention normal.         Mood and Affect: Mood normal.         Behavior: Behavior normal.         Thought Content: Thought content normal.         Judgment: Judgment normal.       Administrative " Statements

## 2024-07-05 NOTE — ASSESSMENT & PLAN NOTE
Patient continues doing well  Full recovery-no residual  Followed up with neurology -next follow-up 8 months  Continues with aspirin 81; atorvastatin 40     ER precaution with any new symptoms

## 2025-07-18 ENCOUNTER — TELEPHONE (OUTPATIENT)
Dept: FAMILY MEDICINE CLINIC | Facility: CLINIC | Age: 60
End: 2025-07-18

## 2025-07-18 NOTE — TELEPHONE ENCOUNTER
Left voicemail to call us back to make an appointment for annual physical. Last office visit for annual physical was 7/1/2024.

## (undated) DEVICE — URETERAL DUAL LUMEN CATH

## (undated) DEVICE — UROCATCH BAG

## (undated) DEVICE — CATH URETERAL 5FR X 70 CM FLEX TIP POLYUR BARD

## (undated) DEVICE — GLOVE SRG BIOGEL 8

## (undated) DEVICE — EXIDINE 4 PCT

## (undated) DEVICE — GUIDEWIRE STRGHT TIP 0.035 IN  SOLO PLUS

## (undated) DEVICE — SHEATH URETERAL ACCESS 10/12FR 45CM PROXIS

## (undated) DEVICE — URETEROSCOPE DIGITAL FLEXIBLE SNGL USE WISCOPE

## (undated) DEVICE — BASKET STONE RTRVL ZERO TIP 2.4FR

## (undated) DEVICE — FIBER STD QUANTA 272 MICRON

## (undated) DEVICE — TUBING SUCTION 5MM X 12 FT

## (undated) DEVICE — SPECIMEN CONTAINER STERILE PEEL PACK

## (undated) DEVICE — CATH URET .038 10FR 50CM DUAL LUMEN

## (undated) DEVICE — PACK TUR

## (undated) DEVICE — SCD SEQUENTIAL COMPRESSION COMFORT SLEEVE MEDIUM KNEE LENGTH: Brand: KENDALL SCD

## (undated) DEVICE — PREMIUM DRY TRAY LF: Brand: MEDLINE INDUSTRIES, INC.